# Patient Record
Sex: FEMALE | Race: WHITE | NOT HISPANIC OR LATINO | Employment: OTHER | URBAN - METROPOLITAN AREA
[De-identification: names, ages, dates, MRNs, and addresses within clinical notes are randomized per-mention and may not be internally consistent; named-entity substitution may affect disease eponyms.]

---

## 2024-02-19 ENCOUNTER — APPOINTMENT (OUTPATIENT)
Dept: CARDIOLOGY | Facility: HOSPITAL | Age: 89
DRG: 178 | End: 2024-02-19
Payer: COMMERCIAL

## 2024-02-19 ENCOUNTER — HOSPITAL ENCOUNTER (INPATIENT)
Facility: HOSPITAL | Age: 89
LOS: 3 days | Discharge: NURSING, REHAB OR LONG-TERM CARE | DRG: 178 | End: 2024-02-22
Attending: EMERGENCY MEDICINE | Admitting: INTERNAL MEDICINE
Payer: COMMERCIAL

## 2024-02-19 ENCOUNTER — APPOINTMENT (OUTPATIENT)
Dept: RADIOLOGY | Facility: HOSPITAL | Age: 89
DRG: 178 | End: 2024-02-19
Payer: COMMERCIAL

## 2024-02-19 DIAGNOSIS — K92.2 GASTROINTESTINAL HEMORRHAGE, UNSPECIFIED GASTROINTESTINAL HEMORRHAGE TYPE: Primary | ICD-10-CM

## 2024-02-19 LAB
ALBUMIN SERPL BCP-MCNC: 3.5 G/DL (ref 3.4–5)
ALP SERPL-CCNC: 77 U/L (ref 33–136)
ALT SERPL W P-5'-P-CCNC: 8 U/L (ref 7–45)
ANION GAP SERPL CALC-SCNC: 11 MMOL/L (ref 10–20)
AST SERPL W P-5'-P-CCNC: 21 U/L (ref 9–39)
BASOPHILS # BLD AUTO: 0.03 X10*3/UL (ref 0–0.1)
BASOPHILS NFR BLD AUTO: 0.6 %
BILIRUB SERPL-MCNC: 0.4 MG/DL (ref 0–1.2)
BUN SERPL-MCNC: 33 MG/DL (ref 6–23)
CALCIUM SERPL-MCNC: 8.7 MG/DL (ref 8.6–10.3)
CHLORIDE SERPL-SCNC: 108 MMOL/L (ref 98–107)
CO2 SERPL-SCNC: 25 MMOL/L (ref 21–32)
CREAT SERPL-MCNC: 0.97 MG/DL (ref 0.5–1.05)
EGFRCR SERPLBLD CKD-EPI 2021: 57 ML/MIN/1.73M*2
EOSINOPHIL # BLD AUTO: 0.03 X10*3/UL (ref 0–0.4)
EOSINOPHIL NFR BLD AUTO: 0.6 %
ERYTHROCYTE [DISTWIDTH] IN BLOOD BY AUTOMATED COUNT: 12.6 % (ref 11.5–14.5)
FLUAV RNA RESP QL NAA+PROBE: NOT DETECTED
FLUBV RNA RESP QL NAA+PROBE: NOT DETECTED
GLUCOSE BLD MANUAL STRIP-MCNC: 94 MG/DL (ref 74–99)
GLUCOSE SERPL-MCNC: 96 MG/DL (ref 74–99)
HCT VFR BLD AUTO: 37.9 % (ref 36–46)
HEMOCCULT SP1 STL QL: NEGATIVE
HGB BLD-MCNC: 11.8 G/DL (ref 12–16)
IMM GRANULOCYTES # BLD AUTO: 0.09 X10*3/UL (ref 0–0.5)
IMM GRANULOCYTES NFR BLD AUTO: 1.7 % (ref 0–0.9)
LYMPHOCYTES # BLD AUTO: 0.84 X10*3/UL (ref 0.8–3)
LYMPHOCYTES NFR BLD AUTO: 16.2 %
MCH RBC QN AUTO: 28.3 PG (ref 26–34)
MCHC RBC AUTO-ENTMCNC: 31.1 G/DL (ref 32–36)
MCV RBC AUTO: 91 FL (ref 80–100)
MONOCYTES # BLD AUTO: 0.52 X10*3/UL (ref 0.05–0.8)
MONOCYTES NFR BLD AUTO: 10 %
NEUTROPHILS # BLD AUTO: 3.68 X10*3/UL (ref 1.6–5.5)
NEUTROPHILS NFR BLD AUTO: 70.9 %
NRBC BLD-RTO: 0 /100 WBCS (ref 0–0)
PLATELET # BLD AUTO: 139 X10*3/UL (ref 150–450)
POTASSIUM SERPL-SCNC: 4.6 MMOL/L (ref 3.5–5.3)
PROT SERPL-MCNC: 6.9 G/DL (ref 6.4–8.2)
RBC # BLD AUTO: 4.17 X10*6/UL (ref 4–5.2)
SARS-COV-2 RNA RESP QL NAA+PROBE: NOT DETECTED
SODIUM SERPL-SCNC: 139 MMOL/L (ref 136–145)
WBC # BLD AUTO: 5.2 X10*3/UL (ref 4.4–11.3)

## 2024-02-19 PROCEDURE — 2500000004 HC RX 250 GENERAL PHARMACY W/ HCPCS (ALT 636 FOR OP/ED)

## 2024-02-19 PROCEDURE — 82947 ASSAY GLUCOSE BLOOD QUANT: CPT

## 2024-02-19 PROCEDURE — 74174 CTA ABD&PLVS W/CONTRAST: CPT

## 2024-02-19 PROCEDURE — 84075 ASSAY ALKALINE PHOSPHATASE: CPT

## 2024-02-19 PROCEDURE — 2550000001 HC RX 255 CONTRASTS: Performed by: EMERGENCY MEDICINE

## 2024-02-19 PROCEDURE — 87636 SARSCOV2 & INF A&B AMP PRB: CPT

## 2024-02-19 PROCEDURE — 93005 ELECTROCARDIOGRAM TRACING: CPT

## 2024-02-19 PROCEDURE — 36415 COLL VENOUS BLD VENIPUNCTURE: CPT

## 2024-02-19 PROCEDURE — 99285 EMERGENCY DEPT VISIT HI MDM: CPT | Mod: 25

## 2024-02-19 PROCEDURE — 1200000002 HC GENERAL ROOM WITH TELEMETRY DAILY

## 2024-02-19 PROCEDURE — 85025 COMPLETE CBC W/AUTO DIFF WBC: CPT

## 2024-02-19 PROCEDURE — 82270 OCCULT BLOOD FECES: CPT

## 2024-02-19 PROCEDURE — 71046 X-RAY EXAM CHEST 2 VIEWS: CPT

## 2024-02-19 PROCEDURE — 71046 X-RAY EXAM CHEST 2 VIEWS: CPT | Performed by: RADIOLOGY

## 2024-02-19 PROCEDURE — 99222 1ST HOSP IP/OBS MODERATE 55: CPT | Performed by: INTERNAL MEDICINE

## 2024-02-19 PROCEDURE — 74174 CTA ABD&PLVS W/CONTRAST: CPT | Performed by: STUDENT IN AN ORGANIZED HEALTH CARE EDUCATION/TRAINING PROGRAM

## 2024-02-19 PROCEDURE — C9113 INJ PANTOPRAZOLE SODIUM, VIA: HCPCS

## 2024-02-19 RX ORDER — ONDANSETRON 4 MG/1
4 TABLET, FILM COATED ORAL EVERY 8 HOURS PRN
Status: DISCONTINUED | OUTPATIENT
Start: 2024-02-19 | End: 2024-02-22 | Stop reason: HOSPADM

## 2024-02-19 RX ORDER — ACETAMINOPHEN 160 MG/5ML
650 SOLUTION ORAL EVERY 4 HOURS PRN
Status: DISCONTINUED | OUTPATIENT
Start: 2024-02-19 | End: 2024-02-22 | Stop reason: HOSPADM

## 2024-02-19 RX ORDER — PANTOPRAZOLE SODIUM 40 MG/10ML
40 INJECTION, POWDER, LYOPHILIZED, FOR SOLUTION INTRAVENOUS ONCE
Status: COMPLETED | OUTPATIENT
Start: 2024-02-19 | End: 2024-02-19

## 2024-02-19 RX ORDER — DEXTROSE MONOHYDRATE 100 MG/ML
0.3 INJECTION, SOLUTION INTRAVENOUS ONCE AS NEEDED
Status: DISCONTINUED | OUTPATIENT
Start: 2024-02-19 | End: 2024-02-22 | Stop reason: HOSPADM

## 2024-02-19 RX ORDER — ONDANSETRON HYDROCHLORIDE 2 MG/ML
4 INJECTION, SOLUTION INTRAVENOUS EVERY 8 HOURS PRN
Status: DISCONTINUED | OUTPATIENT
Start: 2024-02-19 | End: 2024-02-22 | Stop reason: HOSPADM

## 2024-02-19 RX ORDER — PANTOPRAZOLE SODIUM 40 MG/1
40 TABLET, DELAYED RELEASE ORAL EVERY 12 HOURS
Status: DISCONTINUED | OUTPATIENT
Start: 2024-02-19 | End: 2024-02-22 | Stop reason: HOSPADM

## 2024-02-19 RX ORDER — PANTOPRAZOLE SODIUM 40 MG/10ML
40 INJECTION, POWDER, LYOPHILIZED, FOR SOLUTION INTRAVENOUS EVERY 12 HOURS
Status: DISCONTINUED | OUTPATIENT
Start: 2024-02-19 | End: 2024-02-22 | Stop reason: HOSPADM

## 2024-02-19 RX ORDER — TALC
3 POWDER (GRAM) TOPICAL DAILY
Status: DISCONTINUED | OUTPATIENT
Start: 2024-02-19 | End: 2024-02-22 | Stop reason: HOSPADM

## 2024-02-19 RX ORDER — DEXTROSE 50 % IN WATER (D50W) INTRAVENOUS SYRINGE
25
Status: DISCONTINUED | OUTPATIENT
Start: 2024-02-19 | End: 2024-02-19

## 2024-02-19 RX ORDER — DEXTROSE 50 % IN WATER (D50W) INTRAVENOUS SYRINGE
25
Status: DISCONTINUED | OUTPATIENT
Start: 2024-02-19 | End: 2024-02-22 | Stop reason: HOSPADM

## 2024-02-19 RX ORDER — INSULIN LISPRO 100 [IU]/ML
0-10 INJECTION, SOLUTION INTRAVENOUS; SUBCUTANEOUS EVERY 4 HOURS
Status: DISCONTINUED | OUTPATIENT
Start: 2024-02-19 | End: 2024-02-21

## 2024-02-19 RX ORDER — ACETAMINOPHEN 650 MG/1
650 SUPPOSITORY RECTAL EVERY 4 HOURS PRN
Status: DISCONTINUED | OUTPATIENT
Start: 2024-02-19 | End: 2024-02-22 | Stop reason: HOSPADM

## 2024-02-19 RX ORDER — SODIUM CHLORIDE 9 MG/ML
100 INJECTION, SOLUTION INTRAVENOUS CONTINUOUS
Status: DISCONTINUED | OUTPATIENT
Start: 2024-02-19 | End: 2024-02-21

## 2024-02-19 RX ORDER — DEXTROSE MONOHYDRATE 100 MG/ML
0.3 INJECTION, SOLUTION INTRAVENOUS ONCE AS NEEDED
Status: DISCONTINUED | OUTPATIENT
Start: 2024-02-19 | End: 2024-02-19

## 2024-02-19 RX ORDER — ACETAMINOPHEN 325 MG/1
650 TABLET ORAL EVERY 4 HOURS PRN
Status: DISCONTINUED | OUTPATIENT
Start: 2024-02-19 | End: 2024-02-22 | Stop reason: HOSPADM

## 2024-02-19 RX ADMIN — IOHEXOL 90 ML: 350 INJECTION, SOLUTION INTRAVENOUS at 18:06

## 2024-02-19 RX ADMIN — PANTOPRAZOLE SODIUM 40 MG: 40 INJECTION, POWDER, FOR SOLUTION INTRAVENOUS at 13:45

## 2024-02-19 RX ADMIN — AMPICILLIN SODIUM AND SULBACTAM SODIUM 3 G: 2; 1 INJECTION, POWDER, FOR SOLUTION INTRAMUSCULAR; INTRAVENOUS at 19:28

## 2024-02-19 ASSESSMENT — ENCOUNTER SYMPTOMS
NAUSEA: 1
ABDOMINAL PAIN: 1
PSYCHIATRIC NEGATIVE: 1
MUSCULOSKELETAL NEGATIVE: 1
ENDOCRINE NEGATIVE: 1
NEUROLOGICAL NEGATIVE: 1
ALLERGIC/IMMUNOLOGIC NEGATIVE: 1
CARDIOVASCULAR NEGATIVE: 1
HEMATOLOGIC/LYMPHATIC NEGATIVE: 1
SHORTNESS OF BREATH: 1
ANAL BLEEDING: 1
APPETITE CHANGE: 1
COUGH: 1
EYES NEGATIVE: 1

## 2024-02-19 ASSESSMENT — PAIN - FUNCTIONAL ASSESSMENT: PAIN_FUNCTIONAL_ASSESSMENT: 0-10

## 2024-02-19 ASSESSMENT — COLUMBIA-SUICIDE SEVERITY RATING SCALE - C-SSRS
6. HAVE YOU EVER DONE ANYTHING, STARTED TO DO ANYTHING, OR PREPARED TO DO ANYTHING TO END YOUR LIFE?: NO
1. IN THE PAST MONTH, HAVE YOU WISHED YOU WERE DEAD OR WISHED YOU COULD GO TO SLEEP AND NOT WAKE UP?: NO
2. HAVE YOU ACTUALLY HAD ANY THOUGHTS OF KILLING YOURSELF?: NO

## 2024-02-19 NOTE — ED PROVIDER NOTES
CC: Rectal Bleeding ([facility] staff called and stated that they noticed bright red and dark red blood in the pt's stool that started last night. [facility] staff stated the pt fell around 1700 yesterday and could not give details besides no loc, no blood thinners. Pt has a hx of dementia )     HPI: Patient is an 89 yo female with hx of HTN, Dementia (Aox1 baseline), HF, Afib (on apixiban), Gout, iron deficiency anemia, hypothyroidism, DM, presenting to the emergency department for GI bleed. Per her nursing home, had a melanotic stool last night and had an episode of dark/bright red tarry stools in her bed that continued into this morning. Has never had lower GI bleed before. Nursing home also reports productive cough worse this morning. They believe she is at her neurologic baseline today. Hemoglobin at her facility has been chronically in the 10-12 range. On arrival to  emergency department she is AO x 1 for us. No acute complaints yesterday, denies abdominal pain.  No fevers/chills reported at home.       Records Reviewed:  Recent available ED and inpatient notes reviewed in EMR.    PMHx/PSHx:  Per HPI.   - has no past medical history on file.  - has no past surgical history on file.  - does not have a problem list on file.    Medications:  Reviewed in EMR. See EMR for complete list of medications and doses.    Allergies:  Patient has no known allergies.    Social History:  - Tobacco:  has no history on file for tobacco use.   - Alcohol:  has no history on file for alcohol use.   - Illicit Drugs:  has no history on file for drug use.     ROS:  Per HPI.       ???????????????????????????????????????????????????????????????  Triage Vitals:  T 36.9 °C (98.4 °F)  HR 65  /78  RR 16  O2 97 % None (Room air)    Physical Exam  Vitals and nursing note reviewed.   Constitutional:       General: She is not in acute distress.     Appearance: She is well-developed.   HENT:      Head: Normocephalic and atraumatic.   Eyes:       Conjunctiva/sclera: Conjunctivae normal.   Cardiovascular:      Rate and Rhythm: Normal rate and regular rhythm.      Heart sounds: No murmur heard.  Pulmonary:      Effort: Pulmonary effort is normal. No respiratory distress.      Breath sounds: Normal breath sounds.   Abdominal:      Palpations: Abdomen is soft.      Tenderness: There is no abdominal tenderness.   Musculoskeletal:         General: No swelling.      Cervical back: Neck supple.   Skin:     General: Skin is warm and dry.      Capillary Refill: Capillary refill takes less than 2 seconds.   Neurological:      Mental Status: She is alert.      Sensory: Sensation is intact.      Motor: Motor function is intact.      Comments: Oriented x1   Psychiatric:         Mood and Affect: Mood normal.       ???????????????????????????????????????????????????????????????  EKG: Regular rate, irregular rhythm.  Atrial fibrillation seen.  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  QRS, QTc intervals within normal limits.  No ST elevations, depressions, T wave inversions.      Medical Decision Making   Patient is an 88-year-old female presenting to the emergency department today for GI bleed. On arrival, vital signs WNL. On examination, patient is AOx1.  Clear heart and lung sounds bilaterally.  No other acute complaints for us today.  Per nursing home report, has had multiple melanotic stools for the past 24 hours now so we will get a CBC, CMP, and CT abdomen pelvis with contrast for further evaluation.    Update: Chemistry showed elevated BUN consistent with patient's lower GI bleed.  Hemoglobin 11.8.  Do not have previous levels for her but nursing home reports she lives around 12.  Vital signs otherwise normal, lower concern for hemorrhagic shock at this time chest x-ray showed no acute cardiopulmonary process.  COVID/flu negative. Digital rectal exam showed light brown stool, no melena or evidence of bleeding. CTA A/p showed No evidence of active gastrointestinal  hemorrhage with small amount of aspiration pneumonia. Started on Unasyn and will be admitted today for further evaluation.      Diagnoses as of 02/19/24 1851   Gastrointestinal hemorrhage, unspecified gastrointestinal hemorrhage type       Social Determinants Limiting Care:  None identified    Disposition:   Admit    Jorge Patel MD  Emergency Medicine PGY2      Procedures ? [employer] last updated 2/19/2024 6:51 PM          Jorge Patel MD  Resident  02/19/24 1851

## 2024-02-20 LAB
ANION GAP SERPL CALC-SCNC: 14 MMOL/L (ref 10–20)
BUN SERPL-MCNC: 25 MG/DL (ref 6–23)
CALCIUM SERPL-MCNC: 8.7 MG/DL (ref 8.6–10.3)
CHLORIDE SERPL-SCNC: 108 MMOL/L (ref 98–107)
CO2 SERPL-SCNC: 22 MMOL/L (ref 21–32)
CREAT SERPL-MCNC: 1.02 MG/DL (ref 0.5–1.05)
EGFRCR SERPLBLD CKD-EPI 2021: 54 ML/MIN/1.73M*2
ERYTHROCYTE [DISTWIDTH] IN BLOOD BY AUTOMATED COUNT: 12.8 % (ref 11.5–14.5)
EST. AVERAGE GLUCOSE BLD GHB EST-MCNC: 100 MG/DL
GLUCOSE BLD MANUAL STRIP-MCNC: 75 MG/DL (ref 74–99)
GLUCOSE BLD MANUAL STRIP-MCNC: 79 MG/DL (ref 74–99)
GLUCOSE BLD MANUAL STRIP-MCNC: 84 MG/DL (ref 74–99)
GLUCOSE BLD MANUAL STRIP-MCNC: 90 MG/DL (ref 74–99)
GLUCOSE SERPL-MCNC: 75 MG/DL (ref 74–99)
HBA1C MFR BLD: 5.1 %
HCT VFR BLD AUTO: 37.9 % (ref 36–46)
HGB BLD-MCNC: 11.1 G/DL (ref 12–16)
MCH RBC QN AUTO: 27.8 PG (ref 26–34)
MCHC RBC AUTO-ENTMCNC: 29.3 G/DL (ref 32–36)
MCV RBC AUTO: 95 FL (ref 80–100)
NRBC BLD-RTO: 0 /100 WBCS (ref 0–0)
PLATELET # BLD AUTO: 125 X10*3/UL (ref 150–450)
POTASSIUM SERPL-SCNC: 4.5 MMOL/L (ref 3.5–5.3)
RBC # BLD AUTO: 4 X10*6/UL (ref 4–5.2)
SODIUM SERPL-SCNC: 139 MMOL/L (ref 136–145)
WBC # BLD AUTO: 6.5 X10*3/UL (ref 4.4–11.3)

## 2024-02-20 PROCEDURE — 1200000002 HC GENERAL ROOM WITH TELEMETRY DAILY

## 2024-02-20 PROCEDURE — 2500000004 HC RX 250 GENERAL PHARMACY W/ HCPCS (ALT 636 FOR OP/ED): Performed by: INTERNAL MEDICINE

## 2024-02-20 PROCEDURE — 85027 COMPLETE CBC AUTOMATED: CPT | Performed by: INTERNAL MEDICINE

## 2024-02-20 PROCEDURE — 80048 BASIC METABOLIC PNL TOTAL CA: CPT | Performed by: INTERNAL MEDICINE

## 2024-02-20 PROCEDURE — 94667 MNPJ CHEST WALL 1ST: CPT

## 2024-02-20 PROCEDURE — 87389 HIV-1 AG W/HIV-1&-2 AB AG IA: CPT | Mod: AHULAB | Performed by: HOSPITALIST

## 2024-02-20 PROCEDURE — 99222 1ST HOSP IP/OBS MODERATE 55: CPT

## 2024-02-20 PROCEDURE — 2500000001 HC RX 250 WO HCPCS SELF ADMINISTERED DRUGS (ALT 637 FOR MEDICARE OP): Performed by: INTERNAL MEDICINE

## 2024-02-20 PROCEDURE — 2500000002 HC RX 250 W HCPCS SELF ADMINISTERED DRUGS (ALT 637 FOR MEDICARE OP, ALT 636 FOR OP/ED): Mod: MUE | Performed by: HOSPITALIST

## 2024-02-20 PROCEDURE — 82607 VITAMIN B-12: CPT | Mod: AHULAB | Performed by: HOSPITALIST

## 2024-02-20 PROCEDURE — 99222 1ST HOSP IP/OBS MODERATE 55: CPT | Performed by: CLINICAL NURSE SPECIALIST

## 2024-02-20 PROCEDURE — 2500000001 HC RX 250 WO HCPCS SELF ADMINISTERED DRUGS (ALT 637 FOR MEDICARE OP): Performed by: HOSPITALIST

## 2024-02-20 PROCEDURE — C9113 INJ PANTOPRAZOLE SODIUM, VIA: HCPCS | Performed by: INTERNAL MEDICINE

## 2024-02-20 PROCEDURE — 82746 ASSAY OF FOLIC ACID SERUM: CPT | Mod: AHULAB | Performed by: HOSPITALIST

## 2024-02-20 PROCEDURE — 82947 ASSAY GLUCOSE BLOOD QUANT: CPT

## 2024-02-20 PROCEDURE — 94640 AIRWAY INHALATION TREATMENT: CPT

## 2024-02-20 PROCEDURE — 83036 HEMOGLOBIN GLYCOSYLATED A1C: CPT | Mod: AHULAB | Performed by: INTERNAL MEDICINE

## 2024-02-20 PROCEDURE — 92610 EVALUATE SWALLOWING FUNCTION: CPT | Mod: GN | Performed by: SPEECH-LANGUAGE PATHOLOGIST

## 2024-02-20 PROCEDURE — 99232 SBSQ HOSP IP/OBS MODERATE 35: CPT | Performed by: HOSPITALIST

## 2024-02-20 PROCEDURE — 36415 COLL VENOUS BLD VENIPUNCTURE: CPT | Performed by: INTERNAL MEDICINE

## 2024-02-20 RX ORDER — UBIDECARENONE 75 MG
500 CAPSULE ORAL DAILY
Status: DISCONTINUED | OUTPATIENT
Start: 2024-02-20 | End: 2024-02-22 | Stop reason: HOSPADM

## 2024-02-20 RX ORDER — UBIDECARENONE 75 MG
500 CAPSULE ORAL DAILY
COMMUNITY

## 2024-02-20 RX ORDER — IPRATROPIUM BROMIDE AND ALBUTEROL SULFATE 2.5; .5 MG/3ML; MG/3ML
3 SOLUTION RESPIRATORY (INHALATION) 3 TIMES DAILY
Status: DISCONTINUED | OUTPATIENT
Start: 2024-02-20 | End: 2024-02-22 | Stop reason: HOSPADM

## 2024-02-20 RX ORDER — LISINOPRIL 10 MG/1
10 TABLET ORAL DAILY
COMMUNITY

## 2024-02-20 RX ORDER — CHOLECALCIFEROL (VITAMIN D3) 25 MCG
1000 TABLET ORAL DAILY
Status: DISCONTINUED | OUTPATIENT
Start: 2024-02-20 | End: 2024-02-22 | Stop reason: HOSPADM

## 2024-02-20 RX ORDER — SERTRALINE HYDROCHLORIDE 50 MG/1
25 TABLET, FILM COATED ORAL DAILY
Status: DISCONTINUED | OUTPATIENT
Start: 2024-02-20 | End: 2024-02-22 | Stop reason: HOSPADM

## 2024-02-20 RX ORDER — CHOLECALCIFEROL (VITAMIN D3) 25 MCG
1000 TABLET ORAL DAILY
COMMUNITY

## 2024-02-20 RX ORDER — METOPROLOL TARTRATE 25 MG/1
25 TABLET, FILM COATED ORAL DAILY
Status: DISCONTINUED | OUTPATIENT
Start: 2024-02-20 | End: 2024-02-22 | Stop reason: HOSPADM

## 2024-02-20 RX ORDER — QUETIAPINE FUMARATE 25 MG/1
25 TABLET, FILM COATED ORAL 2 TIMES DAILY
Status: DISCONTINUED | OUTPATIENT
Start: 2024-02-20 | End: 2024-02-22 | Stop reason: HOSPADM

## 2024-02-20 RX ORDER — HYDROGEN PEROXIDE 3 %
20 SOLUTION, NON-ORAL MISCELLANEOUS
COMMUNITY
End: 2024-02-22 | Stop reason: HOSPADM

## 2024-02-20 RX ORDER — SERTRALINE HYDROCHLORIDE 25 MG/1
25 TABLET, FILM COATED ORAL DAILY
COMMUNITY
End: 2024-03-19 | Stop reason: ENTERED-IN-ERROR

## 2024-02-20 RX ORDER — DIVALPROEX SODIUM 125 MG/1
125 CAPSULE, COATED PELLETS ORAL 2 TIMES DAILY
COMMUNITY

## 2024-02-20 RX ORDER — METOPROLOL TARTRATE 25 MG/1
25 TABLET, FILM COATED ORAL DAILY
COMMUNITY

## 2024-02-20 RX ORDER — DIVALPROEX SODIUM 125 MG/1
125 CAPSULE, COATED PELLETS ORAL 2 TIMES DAILY
Status: DISCONTINUED | OUTPATIENT
Start: 2024-02-20 | End: 2024-02-22 | Stop reason: HOSPADM

## 2024-02-20 RX ORDER — IPRATROPIUM BROMIDE AND ALBUTEROL SULFATE 2.5; .5 MG/3ML; MG/3ML
3 SOLUTION RESPIRATORY (INHALATION) EVERY 4 HOURS PRN
Status: DISCONTINUED | OUTPATIENT
Start: 2024-02-20 | End: 2024-02-22 | Stop reason: HOSPADM

## 2024-02-20 RX ORDER — ACETAMINOPHEN 500 MG
1000 TABLET ORAL 2 TIMES DAILY PRN
COMMUNITY

## 2024-02-20 RX ORDER — QUETIAPINE FUMARATE 25 MG/1
25 TABLET, FILM COATED ORAL 2 TIMES DAILY
COMMUNITY

## 2024-02-20 RX ADMIN — IPRATROPIUM BROMIDE AND ALBUTEROL SULFATE 3 ML: 2.5; .5 SOLUTION RESPIRATORY (INHALATION) at 20:22

## 2024-02-20 RX ADMIN — DIVALPROEX SODIUM 125 MG: 125 CAPSULE, COATED PELLETS ORAL at 16:20

## 2024-02-20 RX ADMIN — AMPICILLIN SODIUM AND SULBACTAM SODIUM 3 G: 2; 1 INJECTION, POWDER, FOR SOLUTION INTRAMUSCULAR; INTRAVENOUS at 00:59

## 2024-02-20 RX ADMIN — AMPICILLIN SODIUM AND SULBACTAM SODIUM 3 G: 2; 1 INJECTION, POWDER, FOR SOLUTION INTRAMUSCULAR; INTRAVENOUS at 22:57

## 2024-02-20 RX ADMIN — Medication 3 MG: at 01:00

## 2024-02-20 RX ADMIN — SODIUM CHLORIDE 100 ML/HR: 900 INJECTION INTRAVENOUS at 01:00

## 2024-02-20 RX ADMIN — AMPICILLIN SODIUM AND SULBACTAM SODIUM 3 G: 2; 1 INJECTION, POWDER, FOR SOLUTION INTRAMUSCULAR; INTRAVENOUS at 06:21

## 2024-02-20 RX ADMIN — METOPROLOL TARTRATE 25 MG: 25 TABLET, FILM COATED ORAL at 16:21

## 2024-02-20 RX ADMIN — APIXABAN 5 MG: 5 TABLET, FILM COATED ORAL at 20:43

## 2024-02-20 RX ADMIN — PANTOPRAZOLE SODIUM 40 MG: 40 INJECTION, POWDER, FOR SOLUTION INTRAVENOUS at 01:03

## 2024-02-20 RX ADMIN — DIVALPROEX SODIUM 125 MG: 125 CAPSULE, COATED PELLETS ORAL at 20:42

## 2024-02-20 RX ADMIN — AMPICILLIN SODIUM AND SULBACTAM SODIUM 3 G: 2; 1 INJECTION, POWDER, FOR SOLUTION INTRAMUSCULAR; INTRAVENOUS at 16:25

## 2024-02-20 RX ADMIN — Medication 3 MG: at 20:43

## 2024-02-20 RX ADMIN — SERTRALINE HYDROCHLORIDE 25 MG: 50 TABLET ORAL at 16:21

## 2024-02-20 RX ADMIN — PANTOPRAZOLE SODIUM 40 MG: 40 INJECTION, POWDER, FOR SOLUTION INTRAVENOUS at 22:57

## 2024-02-20 RX ADMIN — QUETIAPINE FUMARATE 25 MG: 25 TABLET ORAL at 20:43

## 2024-02-20 SDOH — SOCIAL STABILITY: SOCIAL INSECURITY: ARE THERE ANY APPARENT SIGNS OF INJURIES/BEHAVIORS THAT COULD BE RELATED TO ABUSE/NEGLECT?: UNABLE TO ASSESS

## 2024-02-20 SDOH — SOCIAL STABILITY: SOCIAL INSECURITY: WERE YOU ABLE TO COMPLETE ALL THE BEHAVIORAL HEALTH SCREENINGS?: YES

## 2024-02-20 SDOH — SOCIAL STABILITY: SOCIAL INSECURITY: DOES ANYONE TRY TO KEEP YOU FROM HAVING/CONTACTING OTHER FRIENDS OR DOING THINGS OUTSIDE YOUR HOME?: UNABLE TO ASSESS

## 2024-02-20 SDOH — SOCIAL STABILITY: SOCIAL INSECURITY: DO YOU FEEL ANYONE HAS EXPLOITED OR TAKEN ADVANTAGE OF YOU FINANCIALLY OR OF YOUR PERSONAL PROPERTY?: UNABLE TO ASSESS

## 2024-02-20 SDOH — SOCIAL STABILITY: SOCIAL INSECURITY: DO YOU FEEL UNSAFE GOING BACK TO THE PLACE WHERE YOU ARE LIVING?: UNABLE TO ASSESS

## 2024-02-20 SDOH — SOCIAL STABILITY: SOCIAL INSECURITY: ABUSE: ADULT

## 2024-02-20 SDOH — SOCIAL STABILITY: SOCIAL INSECURITY: HAVE YOU HAD THOUGHTS OF HARMING ANYONE ELSE?: NO

## 2024-02-20 SDOH — SOCIAL STABILITY: SOCIAL INSECURITY: ARE YOU OR HAVE YOU BEEN THREATENED OR ABUSED PHYSICALLY, EMOTIONALLY, OR SEXUALLY BY ANYONE?: UNABLE TO ASSESS

## 2024-02-20 SDOH — SOCIAL STABILITY: SOCIAL INSECURITY: HAS ANYONE EVER THREATENED TO HURT YOUR FAMILY OR YOUR PETS?: UNABLE TO ASSESS

## 2024-02-20 ASSESSMENT — LIFESTYLE VARIABLES
SKIP TO QUESTIONS 9-10: 1
HOW OFTEN DO YOU HAVE 6 OR MORE DRINKS ON ONE OCCASION: NEVER
HOW OFTEN DO YOU HAVE A DRINK CONTAINING ALCOHOL: NEVER
AUDIT-C TOTAL SCORE: 0
AUDIT-C TOTAL SCORE: 0
HOW MANY STANDARD DRINKS CONTAINING ALCOHOL DO YOU HAVE ON A TYPICAL DAY: PATIENT DOES NOT DRINK

## 2024-02-20 ASSESSMENT — COGNITIVE AND FUNCTIONAL STATUS - GENERAL
TOILETING: A LOT
STANDING UP FROM CHAIR USING ARMS: A LOT
TOILETING: A LOT
PERSONAL GROOMING: A LITTLE
MOVING FROM LYING ON BACK TO SITTING ON SIDE OF FLAT BED WITH BEDRAILS: A LITTLE
MOBILITY SCORE: 15
WALKING IN HOSPITAL ROOM: A LOT
MOVING TO AND FROM BED TO CHAIR: A LITTLE
DRESSING REGULAR LOWER BODY CLOTHING: A LITTLE
EATING MEALS: A LITTLE
PERSONAL GROOMING: A LITTLE
PATIENT BASELINE BEDBOUND: NO
TURNING FROM BACK TO SIDE WHILE IN FLAT BAD: A LITTLE
DAILY ACTIVITIY SCORE: 16
DAILY ACTIVITIY SCORE: 16
MOVING TO AND FROM BED TO CHAIR: A LOT
CLIMB 3 TO 5 STEPS WITH RAILING: TOTAL
STANDING UP FROM CHAIR USING ARMS: A LOT
DRESSING REGULAR LOWER BODY CLOTHING: A LITTLE
DRESSING REGULAR UPPER BODY CLOTHING: A LOT
WALKING IN HOSPITAL ROOM: A LOT
HELP NEEDED FOR BATHING: A LOT
TURNING FROM BACK TO SIDE WHILE IN FLAT BAD: A LITTLE
MOBILITY SCORE: 14
DRESSING REGULAR UPPER BODY CLOTHING: A LITTLE
CLIMB 3 TO 5 STEPS WITH RAILING: A LOT
EATING MEALS: A LITTLE
HELP NEEDED FOR BATHING: A LITTLE

## 2024-02-20 ASSESSMENT — ACTIVITIES OF DAILY LIVING (ADL)
WALKS IN HOME: NEEDS ASSISTANCE
FEEDING YOURSELF: NEEDS ASSISTANCE
LACK_OF_TRANSPORTATION: NO
TOILETING: NEEDS ASSISTANCE
HEARING - LEFT EAR: DIFFICULTY WITH NOISE
BATHING: NEEDS ASSISTANCE
ADEQUATE_TO_COMPLETE_ADL: YES
JUDGMENT_ADEQUATE_SAFELY_COMPLETE_DAILY_ACTIVITIES: NO
DRESSING YOURSELF: NEEDS ASSISTANCE
PATIENT'S MEMORY ADEQUATE TO SAFELY COMPLETE DAILY ACTIVITIES?: NO
ASSISTIVE_DEVICE: WALKER
HEARING - RIGHT EAR: DIFFICULTY WITH NOISE
GROOMING: NEEDS ASSISTANCE

## 2024-02-20 ASSESSMENT — PATIENT HEALTH QUESTIONNAIRE - PHQ9
2. FEELING DOWN, DEPRESSED OR HOPELESS: NOT AT ALL
1. LITTLE INTEREST OR PLEASURE IN DOING THINGS: NOT AT ALL
SUM OF ALL RESPONSES TO PHQ9 QUESTIONS 1 & 2: 0

## 2024-02-20 ASSESSMENT — PAIN - FUNCTIONAL ASSESSMENT
PAIN_FUNCTIONAL_ASSESSMENT: 0-10
PAIN_FUNCTIONAL_ASSESSMENT: 0-10

## 2024-02-20 ASSESSMENT — PAIN SCALES - GENERAL
PAINLEVEL_OUTOF10: 0 - NO PAIN
PAINLEVEL_OUTOF10: 0 - NO PAIN

## 2024-02-20 NOTE — H&P
History Of Present Illness  Arielle Alcaraz is a 88 y.o. female with a past medical history of hypertension, diabetes mellitus type 2, gout, iron deficiency anemia, hypothyroidism, chronic atrial fibrillation on apixaban presenting with GI bleeding and aspiration pneumonia from a nursing home.  Per skilled nursing home staff began having melanotic stool red tarry stools last evening.  She is on apixaban for chronic atrial fibrillation.  At baseline the patient is awake and alert and oriented x 1.  Denies any fever or chills or abdominal pain.  Hemoglobin in the ER was 11.8.  CT of the abdomen and pelvis showed aspiration pneumonia but no active GI bleeding     Past Medical History  Chronic atrial fibrillation on apixaban  Type 2 diabetes mellitus  Hypertension  Dementia oriented x 1 at baseline  Hypothyroidism    Surgical History  No past surgical history on file.      Social History  She has no history on file for tobacco use, alcohol use, and drug use.    Family History  No family history on file.     Allergies  Patient has no known allergies.    Review of Systems   Constitutional:  Positive for appetite change.        Oriented x 1   HENT: Negative.     Eyes: Negative.    Respiratory:  Positive for cough and shortness of breath.    Cardiovascular: Negative.    Gastrointestinal:  Positive for abdominal pain, anal bleeding and nausea.   Endocrine: Negative.    Genitourinary: Negative.    Musculoskeletal: Negative.    Skin: Negative.    Allergic/Immunologic: Negative.    Neurological: Negative.    Hematological: Negative.    Psychiatric/Behavioral: Negative.     All other systems reviewed and are negative.       Physical Exam  Vitals and nursing note reviewed.   Constitutional:       Appearance: Normal appearance. She is ill-appearing.      Comments: Covered in melena   HENT:      Head: Normocephalic.      Right Ear: External ear normal.      Left Ear: External ear normal.      Nose: Nose normal.      Mouth/Throat:       Mouth: Mucous membranes are dry.      Pharynx: Oropharynx is clear.   Eyes:      Extraocular Movements: Extraocular movements intact.      Conjunctiva/sclera: Conjunctivae normal.      Pupils: Pupils are equal, round, and reactive to light.   Cardiovascular:      Rate and Rhythm: Normal rate and regular rhythm.   Pulmonary:      Effort: Pulmonary effort is normal.      Breath sounds: Rales present.      Comments: Bibasilar rales  Abdominal:      General: Abdomen is flat. Bowel sounds are normal.      Palpations: Abdomen is soft.      Tenderness: There is abdominal tenderness.   Musculoskeletal:         General: Normal range of motion.   Skin:     General: Skin is warm and dry.   Neurological:      General: No focal deficit present.      Mental Status: She is alert. Mental status is at baseline.      Comments: Oriented x 1   Psychiatric:         Mood and Affect: Mood normal.         Behavior: Behavior normal.          Last Recorded Vitals  Blood pressure 120/50, pulse 77, temperature 36.9 °C (98.4 °F), temperature source Oral, resp. rate 20, SpO2 95 %.    Relevant Results  Meds:  Scheduled medications  ampicillin-sulbactam, 3 g, intravenous, Once  ampicillin-sulbactam, 3 g, intravenous, q6h  insulin lispro, 0-10 Units, subcutaneous, q4h      Continuous medications     PRN medications  PRN medications: dextrose 10 % in water (D10W), dextrose, glucagon   No current outpatient medications     Labs:  Results for orders placed or performed during the hospital encounter of 02/19/24 (from the past 24 hour(s))   CBC and Auto Differential   Result Value Ref Range    WBC 5.2 4.4 - 11.3 x10*3/uL    nRBC 0.0 0.0 - 0.0 /100 WBCs    RBC 4.17 4.00 - 5.20 x10*6/uL    Hemoglobin 11.8 (L) 12.0 - 16.0 g/dL    Hematocrit 37.9 36.0 - 46.0 %    MCV 91 80 - 100 fL    MCH 28.3 26.0 - 34.0 pg    MCHC 31.1 (L) 32.0 - 36.0 g/dL    RDW 12.6 11.5 - 14.5 %    Platelets 139 (L) 150 - 450 x10*3/uL    Neutrophils % 70.9 40.0 - 80.0 %    Immature  Granulocytes %, Automated 1.7 (H) 0.0 - 0.9 %    Lymphocytes % 16.2 13.0 - 44.0 %    Monocytes % 10.0 2.0 - 10.0 %    Eosinophils % 0.6 0.0 - 6.0 %    Basophils % 0.6 0.0 - 2.0 %    Neutrophils Absolute 3.68 1.60 - 5.50 x10*3/uL    Immature Granulocytes Absolute, Automated 0.09 0.00 - 0.50 x10*3/uL    Lymphocytes Absolute 0.84 0.80 - 3.00 x10*3/uL    Monocytes Absolute 0.52 0.05 - 0.80 x10*3/uL    Eosinophils Absolute 0.03 0.00 - 0.40 x10*3/uL    Basophils Absolute 0.03 0.00 - 0.10 x10*3/uL   Comprehensive metabolic panel   Result Value Ref Range    Glucose 96 74 - 99 mg/dL    Sodium 139 136 - 145 mmol/L    Potassium 4.6 3.5 - 5.3 mmol/L    Chloride 108 (H) 98 - 107 mmol/L    Bicarbonate 25 21 - 32 mmol/L    Anion Gap 11 10 - 20 mmol/L    Urea Nitrogen 33 (H) 6 - 23 mg/dL    Creatinine 0.97 0.50 - 1.05 mg/dL    eGFR 57 (L) >60 mL/min/1.73m*2    Calcium 8.7 8.6 - 10.3 mg/dL    Albumin 3.5 3.4 - 5.0 g/dL    Alkaline Phosphatase 77 33 - 136 U/L    Total Protein 6.9 6.4 - 8.2 g/dL    AST 21 9 - 39 U/L    Bilirubin, Total 0.4 0.0 - 1.2 mg/dL    ALT 8 7 - 45 U/L   ECG 12 lead   Result Value Ref Range    Ventricular Rate 61 BPM    QRS Duration 106 ms    QT Interval 430 ms    QTC Calculation(Bazett) 432 ms    R Axis -54 degrees    T Axis 1 degrees    QRS Count 10 beats    Q Onset 208 ms    T Offset 423 ms    QTC Fredericia 432 ms   Sars-CoV-2 PCR   Result Value Ref Range    Coronavirus 2019, PCR Not Detected Not Detected   Influenza A, and B PCR   Result Value Ref Range    Flu A Result Not Detected Not Detected    Flu B Result Not Detected Not Detected      Imaging:  CT angio abdomen pelvis w and or wo IV IV contrast    Result Date: 2/19/2024  Interpreted By:  Donovan Gonzalez, STUDY: CT ANGIO ABDOMEN PELVIS W AND/OR WO IV IV CONTRAST;  2/19/2024 6:10 pm   INDICATION: Signs/Symptoms:Lower GI bleed evaluation.   COMPARISON: None.   ACCESSION NUMBER(S): [id number]   ORDERING CLINICIAN: SUZY EID   TECHNIQUE:  Contiguous non-contrast axial images of the abdomen and pelvis were initially obtained.   Thin-section axial images of the abdomen and pelvis were obtained in the arterial and portal venous phase after intravenous administration of  mL Omnipaque 350 contrast. Sagittal and coronal reformatted images were provided. MIP images and 3D reconstructions were created on an independent workstation and reviewed.   FINDINGS: VASCULATURE:   ABDOMINAL AORTA: No abdominal aortic aneurysm or dissection. Mild abdominal aortic atherosclerosis.   ABDOMINAL and PELVIC ARTERIES:  No hemodynamically significant stenosis or occlusion.     CT ABDOMEN/PELVIS:   LOWER CHEST: There are multifocal peribronchovascular ground-glass opacities in the (right > left) lower lobes consistent with pneumonia, probably due to aspiration given the debris in the lower lobe bronchi with diffuse wall thickening. There is also mild diffuse interlobular septal thickening suggestive of interstitial edema. No pleural effusions. The heart is enlarged due to four-chamber dilatation, disproportionately affecting the atria.   ABDOMINAL WALL: Irregular scarring of the infraumbilical abdominal wall. Postsurgical changes of ventral hernia repair.   LIVER: No significant parenchymal abnormality.   BILE DUCTS: Minimal dilatation of the anterior segmental ducts in the right lobe. No dilatation of the extrahepatic bile ducts or elsewhere.   GALLBLADDER: Surgically absent.   SPLEEN: No significant abnormality. Single calcified granuloma noted.   PANCREAS: No significant abnormality.   ADRENALS: A 0.6 cm hypodense nodule in the apex of the left adrenal gland is consistent with an adenoma. Normal right adrenal gland.   KIDNEYS, URETERS, BLADDER: There are multiple bilateral simple renal cysts and too small to characterize hypodense foci statistically representing benign cysts as well. No hydronephrosis.   REPRODUCTIVE ORGANS: No significant abnormality.   VESSELS: (See  above). No additional significant abnormality.   LYMPH NODES/RETROPERITONEUM: No enlarged lymph nodes. No acute retroperitoneal abnormality.   BOWEL/MESENTERY/PERITONEUM: No gastrointestinal hemorrhage identified. No bowel wall thickening or dilatation.  Normal appendix.   No significant ascites, free air, or fluid collection.     OSSEOUS STRUCTURES: No acute osseous abnormality. Osteopenic bone. Healed fracture of the sacrococcygeal junction noted. There is grade 1 anterolisthesis of L5 on S1 and grade 1 retrolisthesis of L2 on L3, L3 on L4. There is severe lower lumbar facet arthropathy resulting in moderate to high-grade foraminal stenosis from L2-L3 through L5-S1. There is also severe bilateral lateral recess stenosis at L5-S1. Severe right hip osteoarthrosis.       1. No evidence of active gastrointestinal hemorrhage.   2. Small amount of aspiration pneumonia, as well as mild pulmonary edema in the lower lobes.       MACRO: None.   Signed by: Donovan Gonzalez 2/19/2024 6:45 PM Dictation workstation:   [id number]    ECG 12 lead    Result Date: 2/19/2024  Atrial fibrillation Left anterior fascicular block Left ventricular hypertrophy ( R in aVL , Geraldine product , Romhilt-Whitney ) Abnormal ECG No previous ECGs available    XR chest 2 views    Result Date: 2/19/2024  Interpreted By:  Serafin Pulido, STUDY: XR CHEST 2 VIEWS;  2/19/2024 3:16 pm   INDICATION: Signs/Symptoms:productive cough.   COMPARISON: None.   ACCESSION NUMBER(S): [id number]   ORDERING CLINICIAN: SUZY EID   FINDINGS:   The cardiac silhouette is unremarkable. Costophrenic angles are sharp. Calcified granuloma in the right upper lung. Lungs are otherwise clear.. The trachea is midline. There is no pneumothorax. No acute osseous abnormality is seen.       1.  No active cardiopulmonary process.     Signed by: Serafin Pulido 2/19/2024 3:18 PM Dictation workstation:   [id number]       Assessment/Plan   GI bleeding  Plan:  Hold apixaban  IV  hydration  Protonix 40 mg IV every 12 hours  GI consultation    Aspiration pneumonia  Plan:  Unasyn 3 g IV every 6 hours  Pulmonary consultation    Chronic atrial fibrillation  Plan:  Holding apixaban.  Telemetry  Resume home meds when list becomes available    CHF, unknown type, hypertension, hypothyroidism,   Plan:  When list becomes available    DVT prophylaxis  No apixaban heparin or Lovenox due to active GI bleeding  SCDs    I spent 60 minutes in the professional and overall care of this patient.      Everardo Díaz, DO

## 2024-02-20 NOTE — PROGRESS NOTES
Meseret mejía [facility] on memory unit      02/20/24 0852   Current Planned Discharge Disposition   Current Planned Discharge Disposition SNF

## 2024-02-20 NOTE — CONSULTS
"Reason For Consult  Aspiration pneumonia    History Of Present Illness  Arielle Alcaraz is a 88 y.o. female with past medical history of dementia (baseline oriented x 1), HTN, HF, A-fib (on apixaban), gout, hypothyroidism, iron deficiency anemia and diabetes presented to the ED from her skilled facility reporting the patient had fallen the day before, denying LOC, and noting bright red and dark red blood in the patient's stool.  Nursing home staff additionally reported a productive cough with the patient reported no acute complaints.  EKG showed A-fib without acute ischemia.  Labs with hemoglobin 11.8, no leukocytosis; negative for flu and COVID.  CXR negative.  CT A/P showing multifocal peribronchovascular GGO's in b/l lower lobes, concerning for pneumonia.  Treatment was started with Unasyn and patient was admitted for further evaluation.  Pulmonology is consulted for aspiration pneumonia.    Patient seen and examined.  Course as above.  Limited medical records available for review with patient being very poor historian.    When asked why she came to the hospital the patient reported \"I did not have any blood\".  Patient did not recall that she had fallen.  Patient was able to say \"I know I am in some sort of hospital\".  Currently patient reports some shortness of breath when she coughs.  She reports that \"I keep coughing until my brains come out, but I can't get it up\".  Patient does have a moist cough.  She denies feeling feverish or chilled.  She reports shortness of breath with her cough and is stable on room air satting 100%.  Denies having any chest pain or pain with breathing.  Patient reports no difficulty, coughing or choking with chewing and swallowing food and drink.  She reports overall that she is feeling fine and would like to know when she can get back to her home because \"I like to help people\".     Past Medical History  She has no past medical history on file.    Surgical History  She has no past " Ebonie is a 29 year old female here for an obstetrics check.  She is      . Gestational age: 36w2d     She reports fetal movement  She reports occasional contractions  She denies bleeding  She denies headaches  She reports edema  She denies abdominal pain  She denies rupture of membranes  She denies vision changes    See Prenatal Flowsheet for additional comments.   "surgical history on file.     Social History  She has no history on file for tobacco use, alcohol use, and drug use.    Family History  No family history on file.     Allergies  Patient has no known allergies.    Review of Systems  Review of Systems   Reason unable to perform ROS: patient is poor historian with conflicting answers.         Physical Exam    Constitutional:   Elderly, NAD, pleasantly confused and cooperative  HENT: Atraumatic, semimoist mucous membranes; hard of hearing  Eyes: PERRL, nonicteric  Neck: Supple, no JVD  Cardiovascular: S1, S2 distinct, regular, HR 60s to 70s, no murmur appreciated  Pulmonary: Fair air entry with scattered anterior rhonchi posterior right-sided inspiratory squeaks; slightly diminished bases; no crackles or wheezing noted; no conversational dyspnea  Abdominal: Soft, nontender, nondistended, + BS  Musculoskeletal: Fair range of motion with generalized weakness  Extremities:   No BLE edema  Lymphadenopathy: No nuchal LAP  Skin: Warm and dry  Neurological: Alert to self and to \"hospital\"; interactive with clear speech; patient able to answer some simple questions; gives conflicting answers to the same question and requires frequent prompting and redirecting; CNs grossly intact  Psychiatric: Appropriate mood and behavior     Last Recorded Vitals  Blood pressure (!) 153/93, pulse 68, temperature 36.9 °C (98.4 °F), temperature source Oral, resp. rate (!) 22, weight 49.9 kg (110 lb 0.2 oz), SpO2 99 %.    Relevant Results  Scheduled Medications:  ampicillin-sulbactam, 3 g, intravenous, q6h  apixaban, 5 mg, oral, BID  cholecalciferol, 1,000 Units, oral, Daily  cyanocobalamin, 500 mcg, oral, Daily  divalproex sprinkle, 125 mg, oral, BID  insulin lispro, 0-10 Units, subcutaneous, q4h  ipratropium-albuteroL, 3 mL, nebulization, TID  melatonin, 3 mg, oral, Daily  metoprolol tartrate, 25 mg, oral, Daily  pantoprazole, 40 mg, oral, q12h   Or  pantoprazole, 40 mg, intravenous, " q12h  QUEtiapine, 25 mg, oral, BID  sertraline, 25 mg, oral, Daily       PRN medications: acetaminophen **OR** acetaminophen **OR** acetaminophen, dextrose 10 % in water (D10W), dextrose, glucagon, ipratropium-albuteroL, ondansetron **OR** ondansetron      Results for orders placed or performed during the hospital encounter of 02/19/24 (from the past 24 hour(s))   ECG 12 lead   Result Value Ref Range    Ventricular Rate 61 BPM    QRS Duration 106 ms    QT Interval 430 ms    QTC Calculation(Bazett) 432 ms    R Axis -54 degrees    T Axis 1 degrees    QRS Count 10 beats    Q Onset 208 ms    T Offset 423 ms    QTC Fredericia 432 ms   Sars-CoV-2 PCR   Result Value Ref Range    Coronavirus 2019, PCR Not Detected Not Detected   Influenza A, and B PCR   Result Value Ref Range    Flu A Result Not Detected Not Detected    Flu B Result Not Detected Not Detected   Occult Blood, Stool    Specimen: Stool   Result Value Ref Range    Occult Blood, Stool X1 Negative Negative   POCT GLUCOSE   Result Value Ref Range    POCT Glucose 94 74 - 99 mg/dL   CBC   Result Value Ref Range    WBC 6.5 4.4 - 11.3 x10*3/uL    nRBC 0.0 0.0 - 0.0 /100 WBCs    RBC 4.00 4.00 - 5.20 x10*6/uL    Hemoglobin 11.1 (L) 12.0 - 16.0 g/dL    Hematocrit 37.9 36.0 - 46.0 %    MCV 95 80 - 100 fL    MCH 27.8 26.0 - 34.0 pg    MCHC 29.3 (L) 32.0 - 36.0 g/dL    RDW 12.8 11.5 - 14.5 %    Platelets 125 (L) 150 - 450 x10*3/uL   Basic metabolic panel   Result Value Ref Range    Glucose 75 74 - 99 mg/dL    Sodium 139 136 - 145 mmol/L    Potassium 4.5 3.5 - 5.3 mmol/L    Chloride 108 (H) 98 - 107 mmol/L    Bicarbonate 22 21 - 32 mmol/L    Anion Gap 14 10 - 20 mmol/L    Urea Nitrogen 25 (H) 6 - 23 mg/dL    Creatinine 1.02 0.50 - 1.05 mg/dL    eGFR 54 (L) >60 mL/min/1.73m*2    Calcium 8.7 8.6 - 10.3 mg/dL   Hemoglobin A1C   Result Value Ref Range    Hemoglobin A1C 5.1 see below %    Estimated Average Glucose 100 Not Established mg/dL   POCT GLUCOSE   Result Value Ref Range     POCT Glucose 75 74 - 99 mg/dL   POCT GLUCOSE   Result Value Ref Range    POCT Glucose 79 74 - 99 mg/dL      CT angio abdomen pelvis w and or wo IV IV contrast  Result Date: 2/19/2024  Interpreted By:  Donovan Gonzalez, STUDY: CT ANGIO ABDOMEN PELVIS W AND/OR WO IV IV CONTRAST;  2/19/2024 6:10 pm   INDICATION: Signs/Symptoms:Lower GI bleed evaluation.   COMPARISON: None.   ACCESSION NUMBER(S): [id number]   ORDERING CLINICIAN: SUZY EID   TECHNIQUE: Contiguous non-contrast axial images of the abdomen and pelvis were initially obtained.   Thin-section axial images of the abdomen and pelvis were obtained in the arterial and portal venous phase after intravenous administration of  mL Omnipaque 350 contrast. Sagittal and coronal reformatted images were provided. MIP images and 3D reconstructions were created on an independent workstation and reviewed.   FINDINGS: VASCULATURE:   ABDOMINAL AORTA: No abdominal aortic aneurysm or dissection. Mild abdominal aortic atherosclerosis.   ABDOMINAL and PELVIC ARTERIES:  No hemodynamically significant stenosis or occlusion.     CT ABDOMEN/PELVIS:   LOWER CHEST: There are multifocal peribronchovascular ground-glass opacities in the (right > left) lower lobes consistent with pneumonia, probably due to aspiration given the debris in the lower lobe bronchi with diffuse wall thickening. There is also mild diffuse interlobular septal thickening suggestive of interstitial edema. No pleural effusions. The heart is enlarged due to four-chamber dilatation, disproportionately affecting the atria.   ABDOMINAL WALL: Irregular scarring of the infraumbilical abdominal wall. Postsurgical changes of ventral hernia repair.   LIVER: No significant parenchymal abnormality.   BILE DUCTS: Minimal dilatation of the anterior segmental ducts in the right lobe. No dilatation of the extrahepatic bile ducts or elsewhere.   GALLBLADDER: Surgically absent.   SPLEEN: No significant abnormality.  Single calcified granuloma noted.   PANCREAS: No significant abnormality.   ADRENALS: A 0.6 cm hypodense nodule in the apex of the left adrenal gland is consistent with an adenoma. Normal right adrenal gland.   KIDNEYS, URETERS, BLADDER: There are multiple bilateral simple renal cysts and too small to characterize hypodense foci statistically representing benign cysts as well. No hydronephrosis.   REPRODUCTIVE ORGANS: No significant abnormality.   VESSELS: (See above). No additional significant abnormality.   LYMPH NODES/RETROPERITONEUM: No enlarged lymph nodes. No acute retroperitoneal abnormality.   BOWEL/MESENTERY/PERITONEUM: No gastrointestinal hemorrhage identified. No bowel wall thickening or dilatation.  Normal appendix.   No significant ascites, free air, or fluid collection.     OSSEOUS STRUCTURES: No acute osseous abnormality. Osteopenic bone. Healed fracture of the sacrococcygeal junction noted. There is grade 1 anterolisthesis of L5 on S1 and grade 1 retrolisthesis of L2 on L3, L3 on L4. There is severe lower lumbar facet arthropathy resulting in moderate to high-grade foraminal stenosis from L2-L3 through L5-S1. There is also severe bilateral lateral recess stenosis at L5-S1. Severe right hip osteoarthrosis.       1. No evidence of active gastrointestinal hemorrhage.   2. Small amount of aspiration pneumonia, as well as mild pulmonary edema in the lower lobes.       MACRO: None.   Signed by: Donovan Gonzalez 2/19/2024 6:45 PM Dictation workstation:   [id number]    ECG 12 lead  Result Date: 2/19/2024  Atrial fibrillation Left anterior fascicular block Left ventricular hypertrophy ( R in aVL , [place] product , Romhilt-Whitney ) Abnormal ECG No previous ECGs available    XR chest 2 views    Result Date: 2/19/2024  Interpreted By:  Serafin Pulido, STUDY: XR CHEST 2 VIEWS;  2/19/2024 3:16 pm   INDICATION: Signs/Symptoms:productive cough.   COMPARISON: None.   ACCESSION NUMBER(S): [id number]   ORDERING  CLINICIAN: SUZY EID   FINDINGS:   The cardiac silhouette is unremarkable. Costophrenic angles are sharp. Calcified granuloma in the right upper lung. Lungs are otherwise clear.. The trachea is midline. There is no pneumothorax. No acute osseous abnormality is seen.       1.  No active cardiopulmonary process.     Signed by: Serafin Pulido 2/19/2024 3:18 PM Dictation workstation:   [id number]      Assessment/Plan   Arielle Alcaraz is a 88 y.o. female with past medical history of dementia (baseline oriented x 1), HTN, HF, A-fib (on apixaban), gout, hypothyroidism, iron deficiency anemia and diabetes presented to the ED from her skilled facility reporting the patient had fallen the day before, denying LOC, and noting bright red and dark red blood in the patient's stool.  Nursing home staff additionally reported a productive cough with the patient reported no acute complaints.  EKG showed A-fib without acute ischemia.  Labs with hemoglobin 11.8, occult stool negative. No leukocytosis; negative for flu and COVID.  CXR negative.  CT A/P showing multifocal peribronchovascular GGO's in b/l lower lobes, concerning for pneumonia.  Treatment was started with Unasyn and patient was admitted for further evaluation.  Pulmonology is consulted for aspiration pneumonia.    Impressions:  # Pneumonia; community-acquired, likely aspiration with CT A/P showing bilateral GGO's with lower lobe bronchi debris with diffuse wall thickening; moist cough with rhonchi and squeeks  #? Aspiration: poor recall and falls with advanced dementia; debris in bronchi on CT  #Atrial fib: on Eliquis; rate controlled  # Heart Failure: Listed in history; no primary or cardiology notes or echo on file    Recommendations:  -Continue Unasyn 3 g every 6 hours for 5 days  -Adding DuoNebs  -BPH with Acapella  -Evaluation by SLP for aspiration  -Resume Eliquis once cleared by GI    Thank you for the consult.  Pulmonology will follow peripherally.    Amina  Chaparro, APRN-CNS

## 2024-02-20 NOTE — PROGRESS NOTES
"Speech-Language Pathology    Inpatient Speech-Language Pathology Clinical Swallow Evaluation    Patient Name: Arielle Alcaraz  MRN: [id number]  : 1935  Today's Date: 24  Time Calculation  Start Time: 1341  Stop Time: 1354  Time Calculation (min): 13 min       Recommendations:  Regular diet/thin liquids  -upright posture  -slow rate    Subjective:  History and Physical: 87 yo female admitted for GI bleed.  H/o HTN, DM2, ROXIE, hypothyroidism, chronic A Fib, dementia    Relevant SLP history: questionable aspiration pna    General Info:  Reason for Referral: Concern for aspiration  Past Medical History Relevant to Rehab: GI bleed  Prior Level of Function: Decreased function  [place] Diet: Regular diet  Dysphagia Diagnosis: Within functional limits, Mild oral stage dysphagia    Objective:  Oral Mechanism Eval: WFL, edentulous on bottom     Clinical Observations: Clinical swallow evaluation completed.  Pt alert and oriented x1. Pt consumed trials of thin liquids, puree and solid textures.  Functional mastication and bolus formation noted with no significant oral stasis.  Seemingly functional AP transfer.  Hyolaryngeal elevation/excursion palpated and appears functional.  No overt s/s aspiration noted with single and consecutive cup and straw drinks of thin water.  No s/s aspiration with puree and solids.  Suspect possible esophageal dysphagia, possible reflux resulting in aspiration.  Pt reports \"feels like it's coming back up\"      Diet Recommendations: Regular diet/thin liquids             Assessment: Pt presents with suspected esophageal dysphagia    Plan: No skilled ST needs               GOALS:  Pt. to tolerate least restrictive diet without pulmonary compromise    Education:   Pt. Educated on purpose of assessment, recommended diet and recommended safe swallow strategies              "

## 2024-02-20 NOTE — PROGRESS NOTES
Speech-Language Pathology                 Therapy Communication Note    Patient Name: Arielle Alcaraz  MRN: [id number]  Today's Date: 2/20/2024     Discipline: Speech Language Pathology    Missed Visit Reason: Missed Time Reason: NPO    Missed Time: Attempt    Comment: NPO for GI consult

## 2024-02-20 NOTE — CONSULTS
Inpatient consult to gastroenterology  Consult performed by: TANIKA Morris-CNP  Consult ordered by: Everardo Díza DO  Reason for consult: GIB      Gastroenterology Consultation Note    ASSESSMENT and PLAN:     Arielle Alcaraz is a 88 y.o. female with a past medical history of HTN, DM 2, ROXIE, hypothyroidism, chronic A-fib on Eliquis, dementia who presented with SNP reports of melena.  GI consulted for GIB    No signs of acute GIB.  Occult blood negative, hgb at baseline, brown stool. Consider hemorrhoids or vaginal bleeding    - recommend metamucil    GI signing off     Kath Cameron CNP    HISTORY OF PRESENT ILLNESS:     History Of Present Illness:    Arielle Alcaraz is a 88 y.o. female who presented to the hospital from SNF with reports of melena and dark red tarry stools.  Patient unable to provide history due to her dementia/A&O x 1.  Hemoglobin stable around 11.  CTA yesterday was negative.  Occult blood negative. No history of colonoscopy available     Relevant endoscopic evaluation results were personally reviewed.    Review of systems:   Review of Systems   Unable to perform ROS: Dementia      A 10+ point ROS was completed and otherwise negative except as noted and per HPI.    PAST HISTORIES:     Past Medical History:  No past medical history on file.    Past Surgical History:  No past surgical history on file.     Family History:  No family history on file.     Social History:   has no history on file for tobacco use, alcohol use, and drug use.    OBJECTIVE:     Last Recorded Vitals:  Vitals:    02/20/24 0900 02/20/24 0930 02/20/24 1030 02/20/24 1229   BP: 160/54 134/58 112/65 (!) 153/93   Pulse: 56 60 50 68   Resp: 14 17 18 (!) 22   Temp:       TempSrc:       SpO2: (!) 93% 94% (!) 93% 99%   Weight:           Physical Exam:  Physical Exam  Constitutional:       Appearance: Normal appearance. She is normal weight.   HENT:      Mouth/Throat:      Mouth: Mucous membranes are dry.      Pharynx: Oropharynx is  clear.   Cardiovascular:      Rate and Rhythm: Normal rate and regular rhythm.   Pulmonary:      Effort: Pulmonary effort is normal.      Breath sounds: Normal breath sounds. No wheezing or rhonchi.   Abdominal:      General: Abdomen is flat. Bowel sounds are normal. There is no distension.      Palpations: Abdomen is soft. There is no hepatomegaly.      Tenderness: There is no abdominal tenderness. There is no guarding or rebound. Negative signs include Carter's sign.      Hernia: No hernia is present.   Musculoskeletal:         General: Normal range of motion.   Skin:     General: Skin is warm and dry.   Neurological:      General: No focal deficit present.      Mental Status: She is alert. Mental status is at baseline. She is disoriented.   Psychiatric:         Mood and Affect: Mood normal.         Behavior: Behavior normal.           Allergies:  No Known Allergies    Inpatient Medications:  ampicillin-sulbactam, 3 g, intravenous, q6h  apixaban, 5 mg, oral, BID  cholecalciferol, 1,000 Units, oral, Daily  cyanocobalamin, 500 mcg, oral, Daily  divalproex sprinkle, 125 mg, oral, BID  insulin lispro, 0-10 Units, subcutaneous, q4h  ipratropium-albuteroL, 3 mL, nebulization, TID  melatonin, 3 mg, oral, Daily  metoprolol tartrate, 25 mg, oral, Daily  pantoprazole, 40 mg, oral, q12h   Or  pantoprazole, 40 mg, intravenous, q12h  QUEtiapine, 25 mg, oral, BID  sertraline, 25 mg, oral, Daily      sodium chloride 0.9%, 100 mL/hr, Last Rate: 100 mL/hr (02/20/24 0100)      PRN medications: acetaminophen **OR** acetaminophen **OR** acetaminophen, dextrose 10 % in water (D10W), dextrose, glucagon, ipratropium-albuteroL, ondansetron **OR** ondansetron    Outpatient Medications:  Prior to Admission medications    Medication Sig Start Date End Date Taking? Authorizing Provider   acetaminophen (Tylenol) 500 mg tablet Take 2 tablets (1,000 mg) by mouth 2 times a day as needed for mild pain (1 - 3).    Historical Provider, MD    apixaban (Eliquis) 5 mg tablet Take 1 tablet (5 mg) by mouth 2 times a day.    Historical Provider, MD   cholecalciferol (Vitamin D-3) 25 MCG (1000 UT) tablet Take 1 tablet (1,000 Units) by mouth once daily.    Historical Provider, MD   cyanocobalamin (Vitamin B-12) 500 mcg tablet Take 1 tablet (500 mcg) by mouth once daily.    Historical Provider, MD   divalproex sprinkle (Depakote Sprinkle) 125 mg DR capsule Take 1 capsule (125 mg) by mouth 2 times a day.    Historical Provider, MD   esomeprazole (NexIUM) 20 mg DR capsule Take 1 capsule (20 mg) by mouth once daily in the morning. Take before meals. Do not open capsule. Take 30 minutes before eating.    Historical Provider, MD   lisinopril 10 mg tablet Take 1 tablet (10 mg) by mouth once daily.    Historical Provider, MD   metoprolol tartrate (Lopressor) 25 mg tablet Take 1 tablet (25 mg) by mouth once daily.    Historical Provider, MD   multivitamin-children's (Cerovite, Jr) chewable tablet Chew 1 tablet once daily.    Historical Provider, MD   QUEtiapine (SEROquel) 25 mg tablet Take 1 tablet (25 mg) by mouth 2 times a day.    Historical Provider, MD   sertraline (Zoloft) 25 mg tablet Take 1 tablet (25 mg) by mouth once daily.    Historical Provider, MD       LABS AND IMAGING:     Labs:  Results for orders placed or performed during the hospital encounter of 02/19/24 (from the past 24 hour(s))   CBC and Auto Differential   Result Value Ref Range    WBC 5.2 4.4 - 11.3 x10*3/uL    nRBC 0.0 0.0 - 0.0 /100 WBCs    RBC 4.17 4.00 - 5.20 x10*6/uL    Hemoglobin 11.8 (L) 12.0 - 16.0 g/dL    Hematocrit 37.9 36.0 - 46.0 %    MCV 91 80 - 100 fL    MCH 28.3 26.0 - 34.0 pg    MCHC 31.1 (L) 32.0 - 36.0 g/dL    RDW 12.6 11.5 - 14.5 %    Platelets 139 (L) 150 - 450 x10*3/uL    Neutrophils % 70.9 40.0 - 80.0 %    Immature Granulocytes %, Automated 1.7 (H) 0.0 - 0.9 %    Lymphocytes % 16.2 13.0 - 44.0 %    Monocytes % 10.0 2.0 - 10.0 %    Eosinophils % 0.6 0.0 - 6.0 %    Basophils  % 0.6 0.0 - 2.0 %    Neutrophils Absolute 3.68 1.60 - 5.50 x10*3/uL    Immature Granulocytes Absolute, Automated 0.09 0.00 - 0.50 x10*3/uL    Lymphocytes Absolute 0.84 0.80 - 3.00 x10*3/uL    Monocytes Absolute 0.52 0.05 - 0.80 x10*3/uL    Eosinophils Absolute 0.03 0.00 - 0.40 x10*3/uL    Basophils Absolute 0.03 0.00 - 0.10 x10*3/uL   Comprehensive metabolic panel   Result Value Ref Range    Glucose 96 74 - 99 mg/dL    Sodium 139 136 - 145 mmol/L    Potassium 4.6 3.5 - 5.3 mmol/L    Chloride 108 (H) 98 - 107 mmol/L    Bicarbonate 25 21 - 32 mmol/L    Anion Gap 11 10 - 20 mmol/L    Urea Nitrogen 33 (H) 6 - 23 mg/dL    Creatinine 0.97 0.50 - 1.05 mg/dL    eGFR 57 (L) >60 mL/min/1.73m*2    Calcium 8.7 8.6 - 10.3 mg/dL    Albumin 3.5 3.4 - 5.0 g/dL    Alkaline Phosphatase 77 33 - 136 U/L    Total Protein 6.9 6.4 - 8.2 g/dL    AST 21 9 - 39 U/L    Bilirubin, Total 0.4 0.0 - 1.2 mg/dL    ALT 8 7 - 45 U/L   ECG 12 lead   Result Value Ref Range    Ventricular Rate 61 BPM    QRS Duration 106 ms    QT Interval 430 ms    QTC Calculation(Bazett) 432 ms    R Axis -54 degrees    T Axis 1 degrees    QRS Count 10 beats    Q Onset 208 ms    T Offset 423 ms    QTC Fredericia 432 ms   Sars-CoV-2 PCR   Result Value Ref Range    Coronavirus 2019, PCR Not Detected Not Detected   Influenza A, and B PCR   Result Value Ref Range    Flu A Result Not Detected Not Detected    Flu B Result Not Detected Not Detected   Occult Blood, Stool    Specimen: Stool   Result Value Ref Range    Occult Blood, Stool X1 Negative Negative   POCT GLUCOSE   Result Value Ref Range    POCT Glucose 94 74 - 99 mg/dL   CBC   Result Value Ref Range    WBC 6.5 4.4 - 11.3 x10*3/uL    nRBC 0.0 0.0 - 0.0 /100 WBCs    RBC 4.00 4.00 - 5.20 x10*6/uL    Hemoglobin 11.1 (L) 12.0 - 16.0 g/dL    Hematocrit 37.9 36.0 - 46.0 %    MCV 95 80 - 100 fL    MCH 27.8 26.0 - 34.0 pg    MCHC 29.3 (L) 32.0 - 36.0 g/dL    RDW 12.8 11.5 - 14.5 %    Platelets 125 (L) 150 - 450 x10*3/uL   Basic  metabolic panel   Result Value Ref Range    Glucose 75 74 - 99 mg/dL    Sodium 139 136 - 145 mmol/L    Potassium 4.5 3.5 - 5.3 mmol/L    Chloride 108 (H) 98 - 107 mmol/L    Bicarbonate 22 21 - 32 mmol/L    Anion Gap 14 10 - 20 mmol/L    Urea Nitrogen 25 (H) 6 - 23 mg/dL    Creatinine 1.02 0.50 - 1.05 mg/dL    eGFR 54 (L) >60 mL/min/1.73m*2    Calcium 8.7 8.6 - 10.3 mg/dL   POCT GLUCOSE   Result Value Ref Range    POCT Glucose 75 74 - 99 mg/dL   POCT GLUCOSE   Result Value Ref Range    POCT Glucose 79 74 - 99 mg/dL        Relevant imaging results were personally reviewed.

## 2024-02-20 NOTE — PROGRESS NOTES
Pharmacy Medication History Review    Arielle Alcaraz is a 88 y.o. female admitted for Gastrointestinal hemorrhage, unspecified gastrointestinal hemorrhage type. Pharmacy reviewed the patient's [id number] medications and allergies for accuracy.    The list below reflectives the updated PTA list. Please review each medication in order reconciliation for additional clarification and justification.  Prior to Admission Medications   Prescriptions Last Dose Informant Patient Reported? Taking?   QUEtiapine (SEROquel) 25 mg tablet   Yes No   Sig: Take 1 tablet (25 mg) by mouth 2 times a day.   acetaminophen (Tylenol) 500 mg tablet   Yes No   Sig: Take 2 tablets (1,000 mg) by mouth 2 times a day as needed for mild pain (1 - 3).   apixaban (Eliquis) 5 mg tablet   Yes No   Sig: Take 1 tablet (5 mg) by mouth 2 times a day.   cholecalciferol (Vitamin D-3) 25 MCG (1000 UT) tablet   Yes No   Sig: Take 1 tablet (1,000 Units) by mouth once daily.   cyanocobalamin (Vitamin B-12) 500 mcg tablet   Yes No   Sig: Take 1 tablet (500 mcg) by mouth once daily.   divalproex sprinkle (Depakote Sprinkle) 125 mg DR capsule   Yes No   Sig: Take 1 capsule (125 mg) by mouth 2 times a day.   esomeprazole (NexIUM) 20 mg DR capsule   Yes No   Sig: Take 1 capsule (20 mg) by mouth once daily in the morning. Take before meals. Do not open capsule. Take 30 minutes before eating.   lisinopril 10 mg tablet   Yes No   Sig: Take 1 tablet (10 mg) by mouth once daily.   metoprolol tartrate (Lopressor) 25 mg tablet   Yes No   Sig: Take 1 tablet (25 mg) by mouth once daily.   multivitamin-children's (Cerovite, Jr) chewable tablet   Yes No   Sig: Chew 1 tablet once daily.   sertraline (Zoloft) 25 mg tablet   Yes No   Sig: Take 1 tablet (25 mg) by mouth once daily.      Facility-Administered Medications: None      Allergies  Reviewed by Becky Wei, EMT on 2/19/2024   No Known Allergies         Below are additional concerns with the patient's PTA  list.  Medication lest sent from facility.    Maria De Jesus Wang CPhT

## 2024-02-20 NOTE — PROGRESS NOTES
Transitional Care Coordination Progress Note:  Plan per Medical/Surgical team: treatment of asp PN & rectal bleed with IV ATB, IV protonix, IV fluids, NPO, pulm & GI consult  Status: Inpatient  Payor source: [employer]  [facility]  Potential Barriers: /101  ADOD: 2/22/2024  JHON Michael RN, BSN Transitional Care Coordinator ED# [phone]      02/20/24 0853   Discharge Planning   Living Arrangements Alone   Support Systems Family members   Assistance Needed GI work up   Type of Residence Skilled nursing facility   Do you have animals or pets at home? No   Home or Post Acute Services Post acute facilities (Rehab/SNF/etc)   Type of Post Acute Facility Services Skilled nursing   Patient expects to be discharged to: [facility]   Does the patient need discharge transport arranged? Yes   RoundTrip coordination needed? Yes   Has discharge transport been arranged? No   Financial Resource Strain   How hard is it for you to pay for the very basics like food, housing, medical care, and heating? Not hard   Housing Stability   In the last 12 months, was there a time when you were not able to pay the mortgage or rent on time? N   In the last 12 months, how many places have you lived? 1   In the last 12 months, was there a time when you did not have a steady place to sleep or slept in a shelter (including now)? N   Transportation Needs   In the past 12 months, has lack of transportation kept you from medical appointments or from getting medications? no   In the past 12 months, has lack of transportation kept you from meetings, work, or from getting things needed for daily living? No   Patient Choice   Provider Choice list and CMS website (https://medicare.gov/care-compare#search) for post-acute Quality and Resource Measure Data were provided and reviewed with: Family;Patient   Patient / Family choosing to utilize agency / facility established prior to hospitalization  Yes

## 2024-02-20 NOTE — PROGRESS NOTES
02/20/24 0852   [facility] Disability Status   Are you deaf or do you have serious difficulty hearing? N   Are you blind or do you have serious difficulty seeing, even when wearing glasses? N   Because of a physical, mental, or emotional condition, do you have serious difficulty concentrating, remembering, or making decisions? (5 years old or older) Y   Do you have serious difficulty walking or climbing stairs? Y   Do you have serious difficulty dressing or bathing? Y   Because of a physical, mental, or emotional condition, do you have serious difficulty doing errands alone such as visiting the doctor? Y

## 2024-02-21 ENCOUNTER — APPOINTMENT (OUTPATIENT)
Dept: CARDIOLOGY | Facility: HOSPITAL | Age: 89
DRG: 178 | End: 2024-02-21
Payer: COMMERCIAL

## 2024-02-21 ENCOUNTER — APPOINTMENT (OUTPATIENT)
Dept: RADIOLOGY | Facility: HOSPITAL | Age: 89
DRG: 178 | End: 2024-02-21
Payer: COMMERCIAL

## 2024-02-21 LAB
ALBUMIN SERPL BCP-MCNC: 2.8 G/DL (ref 3.4–5)
ALP SERPL-CCNC: 66 U/L (ref 33–136)
ALT SERPL W P-5'-P-CCNC: 7 U/L (ref 7–45)
AST SERPL W P-5'-P-CCNC: 15 U/L (ref 9–39)
BASOPHILS # BLD AUTO: 0.04 X10*3/UL (ref 0–0.1)
BASOPHILS NFR BLD AUTO: 0.7 %
BILIRUB DIRECT SERPL-MCNC: 0.1 MG/DL (ref 0–0.3)
BILIRUB SERPL-MCNC: 0.3 MG/DL (ref 0–1.2)
CARDIAC TROPONIN I PNL SERPL HS: 15 NG/L (ref 0–13)
EOSINOPHIL # BLD AUTO: 0.15 X10*3/UL (ref 0–0.4)
EOSINOPHIL NFR BLD AUTO: 2.7 %
ERYTHROCYTE [DISTWIDTH] IN BLOOD BY AUTOMATED COUNT: 12.6 % (ref 11.5–14.5)
FOLATE SERPL-MCNC: >24 NG/ML
GLUCOSE BLD MANUAL STRIP-MCNC: 100 MG/DL (ref 74–99)
GLUCOSE BLD MANUAL STRIP-MCNC: 76 MG/DL (ref 74–99)
GLUCOSE BLD MANUAL STRIP-MCNC: 78 MG/DL (ref 74–99)
GLUCOSE BLD MANUAL STRIP-MCNC: 80 MG/DL (ref 74–99)
GLUCOSE BLD MANUAL STRIP-MCNC: 81 MG/DL (ref 74–99)
GLUCOSE BLD MANUAL STRIP-MCNC: 86 MG/DL (ref 74–99)
GLUCOSE BLD MANUAL STRIP-MCNC: 89 MG/DL (ref 74–99)
GLUCOSE BLD MANUAL STRIP-MCNC: 98 MG/DL (ref 74–99)
HAV IGM SER QL: NONREACTIVE
HBV CORE IGM SER QL: NONREACTIVE
HBV SURFACE AG SERPL QL IA: NONREACTIVE
HCT VFR BLD AUTO: 36 % (ref 36–46)
HCV AB SER QL: NONREACTIVE
HGB BLD-MCNC: 10.5 G/DL (ref 12–16)
HIV 1+2 AB+HIV1 P24 AG SERPL QL IA: NONREACTIVE
HOLD SPECIMEN: NORMAL
IMM GRANULOCYTES # BLD AUTO: 0.01 X10*3/UL (ref 0–0.5)
IMM GRANULOCYTES NFR BLD AUTO: 0.2 % (ref 0–0.9)
LYMPHOCYTES # BLD AUTO: 1.96 X10*3/UL (ref 0.8–3)
LYMPHOCYTES NFR BLD AUTO: 34.7 %
MCH RBC QN AUTO: 28.4 PG (ref 26–34)
MCHC RBC AUTO-ENTMCNC: 29.2 G/DL (ref 32–36)
MCV RBC AUTO: 97 FL (ref 80–100)
MONOCYTES # BLD AUTO: 0.78 X10*3/UL (ref 0.05–0.8)
MONOCYTES NFR BLD AUTO: 13.8 %
NEUTROPHILS # BLD AUTO: 2.71 X10*3/UL (ref 1.6–5.5)
NEUTROPHILS NFR BLD AUTO: 47.9 %
NRBC BLD-RTO: 0 /100 WBCS (ref 0–0)
PLATELET # BLD AUTO: 119 X10*3/UL (ref 150–450)
PROT SERPL-MCNC: 5.6 G/DL (ref 6.4–8.2)
RBC # BLD AUTO: 3.7 X10*6/UL (ref 4–5.2)
VIT B12 SERPL-MCNC: 865 PG/ML (ref 211–911)
WBC # BLD AUTO: 5.7 X10*3/UL (ref 4.4–11.3)

## 2024-02-21 PROCEDURE — 99232 SBSQ HOSP IP/OBS MODERATE 35: CPT | Performed by: HOSPITALIST

## 2024-02-21 PROCEDURE — 36415 COLL VENOUS BLD VENIPUNCTURE: CPT | Performed by: HOSPITALIST

## 2024-02-21 PROCEDURE — 80074 ACUTE HEPATITIS PANEL: CPT | Mod: AHULAB | Performed by: HOSPITALIST

## 2024-02-21 PROCEDURE — 80076 HEPATIC FUNCTION PANEL: CPT | Performed by: HOSPITALIST

## 2024-02-21 PROCEDURE — 1200000002 HC GENERAL ROOM WITH TELEMETRY DAILY

## 2024-02-21 PROCEDURE — 82947 ASSAY GLUCOSE BLOOD QUANT: CPT

## 2024-02-21 PROCEDURE — 71045 X-RAY EXAM CHEST 1 VIEW: CPT

## 2024-02-21 PROCEDURE — 2500000001 HC RX 250 WO HCPCS SELF ADMINISTERED DRUGS (ALT 637 FOR MEDICARE OP): Performed by: HOSPITALIST

## 2024-02-21 PROCEDURE — 93005 ELECTROCARDIOGRAM TRACING: CPT

## 2024-02-21 PROCEDURE — 84484 ASSAY OF TROPONIN QUANT: CPT | Performed by: HOSPITALIST

## 2024-02-21 PROCEDURE — 71045 X-RAY EXAM CHEST 1 VIEW: CPT | Performed by: RADIOLOGY

## 2024-02-21 PROCEDURE — 93010 ELECTROCARDIOGRAM REPORT: CPT | Performed by: STUDENT IN AN ORGANIZED HEALTH CARE EDUCATION/TRAINING PROGRAM

## 2024-02-21 PROCEDURE — 2500000004 HC RX 250 GENERAL PHARMACY W/ HCPCS (ALT 636 FOR OP/ED): Performed by: INTERNAL MEDICINE

## 2024-02-21 PROCEDURE — 94668 MNPJ CHEST WALL SBSQ: CPT

## 2024-02-21 PROCEDURE — 85025 COMPLETE CBC W/AUTO DIFF WBC: CPT | Performed by: HOSPITALIST

## 2024-02-21 PROCEDURE — 2500000002 HC RX 250 W HCPCS SELF ADMINISTERED DRUGS (ALT 637 FOR MEDICARE OP, ALT 636 FOR OP/ED): Mod: MUE | Performed by: HOSPITALIST

## 2024-02-21 PROCEDURE — 94640 AIRWAY INHALATION TREATMENT: CPT | Mod: MUE

## 2024-02-21 PROCEDURE — 2500000001 HC RX 250 WO HCPCS SELF ADMINISTERED DRUGS (ALT 637 FOR MEDICARE OP): Performed by: INTERNAL MEDICINE

## 2024-02-21 PROCEDURE — 2500000002 HC RX 250 W HCPCS SELF ADMINISTERED DRUGS (ALT 637 FOR MEDICARE OP, ALT 636 FOR OP/ED): Mod: MUE | Performed by: INTERNAL MEDICINE

## 2024-02-21 RX ORDER — INSULIN LISPRO 100 [IU]/ML
0-10 INJECTION, SOLUTION INTRAVENOUS; SUBCUTANEOUS
Status: DISCONTINUED | OUTPATIENT
Start: 2024-02-21 | End: 2024-02-22 | Stop reason: HOSPADM

## 2024-02-21 RX ADMIN — APIXABAN 5 MG: 5 TABLET, FILM COATED ORAL at 11:07

## 2024-02-21 RX ADMIN — ACETAMINOPHEN 650 MG: 325 TABLET ORAL at 14:36

## 2024-02-21 RX ADMIN — SODIUM CHLORIDE 100 ML/HR: 900 INJECTION INTRAVENOUS at 13:55

## 2024-02-21 RX ADMIN — Medication 3 MG: at 20:39

## 2024-02-21 RX ADMIN — DIVALPROEX SODIUM 125 MG: 125 CAPSULE, COATED PELLETS ORAL at 11:07

## 2024-02-21 RX ADMIN — PSYLLIUM HUSK 1 PACKET: 3.4 POWDER ORAL at 20:39

## 2024-02-21 RX ADMIN — IPRATROPIUM BROMIDE AND ALBUTEROL SULFATE 3 ML: 2.5; .5 SOLUTION RESPIRATORY (INHALATION) at 19:54

## 2024-02-21 RX ADMIN — SODIUM CHLORIDE 100 ML/HR: 900 INJECTION INTRAVENOUS at 05:16

## 2024-02-21 RX ADMIN — APIXABAN 5 MG: 5 TABLET, FILM COATED ORAL at 20:39

## 2024-02-21 RX ADMIN — PANTOPRAZOLE SODIUM 40 MG: 40 TABLET, DELAYED RELEASE ORAL at 22:45

## 2024-02-21 RX ADMIN — Medication 1000 UNITS: at 11:07

## 2024-02-21 RX ADMIN — AMPICILLIN SODIUM AND SULBACTAM SODIUM 3 G: 2; 1 INJECTION, POWDER, FOR SOLUTION INTRAMUSCULAR; INTRAVENOUS at 11:08

## 2024-02-21 RX ADMIN — CYANOCOBALAMIN TAB 500 MCG 500 MCG: 500 TAB at 11:07

## 2024-02-21 RX ADMIN — IPRATROPIUM BROMIDE AND ALBUTEROL SULFATE 3 ML: 2.5; .5 SOLUTION RESPIRATORY (INHALATION) at 07:50

## 2024-02-21 RX ADMIN — AMPICILLIN SODIUM AND SULBACTAM SODIUM 3 G: 2; 1 INJECTION, POWDER, FOR SOLUTION INTRAMUSCULAR; INTRAVENOUS at 05:17

## 2024-02-21 RX ADMIN — SERTRALINE HYDROCHLORIDE 25 MG: 50 TABLET ORAL at 11:07

## 2024-02-21 RX ADMIN — AMPICILLIN SODIUM AND SULBACTAM SODIUM 3 G: 2; 1 INJECTION, POWDER, FOR SOLUTION INTRAMUSCULAR; INTRAVENOUS at 22:15

## 2024-02-21 RX ADMIN — QUETIAPINE FUMARATE 25 MG: 25 TABLET ORAL at 20:39

## 2024-02-21 RX ADMIN — DIVALPROEX SODIUM 125 MG: 125 CAPSULE, COATED PELLETS ORAL at 20:39

## 2024-02-21 RX ADMIN — PANTOPRAZOLE SODIUM 40 MG: 40 TABLET, DELAYED RELEASE ORAL at 11:07

## 2024-02-21 RX ADMIN — METOPROLOL TARTRATE 25 MG: 25 TABLET, FILM COATED ORAL at 11:07

## 2024-02-21 RX ADMIN — QUETIAPINE FUMARATE 25 MG: 25 TABLET ORAL at 11:07

## 2024-02-21 RX ADMIN — AMPICILLIN SODIUM AND SULBACTAM SODIUM 3 G: 2; 1 INJECTION, POWDER, FOR SOLUTION INTRAMUSCULAR; INTRAVENOUS at 16:37

## 2024-02-21 ASSESSMENT — COGNITIVE AND FUNCTIONAL STATUS - GENERAL
WALKING IN HOSPITAL ROOM: A LITTLE
MOVING TO AND FROM BED TO CHAIR: A LITTLE
CLIMB 3 TO 5 STEPS WITH RAILING: A LOT
MOBILITY SCORE: 17
PERSONAL GROOMING: A LITTLE
STANDING UP FROM CHAIR USING ARMS: A LOT
DAILY ACTIVITIY SCORE: 17
EATING MEALS: A LITTLE
TURNING FROM BACK TO SIDE WHILE IN FLAT BAD: A LITTLE
DRESSING REGULAR LOWER BODY CLOTHING: A LITTLE
TOILETING: A LITTLE
DRESSING REGULAR UPPER BODY CLOTHING: A LITTLE
HELP NEEDED FOR BATHING: A LOT

## 2024-02-21 ASSESSMENT — PAIN SCALES - GENERAL
PAINLEVEL_OUTOF10: 0 - NO PAIN
PAINLEVEL_OUTOF10: 0 - NO PAIN

## 2024-02-21 ASSESSMENT — PAIN SCALES - PAIN ASSESSMENT IN ADVANCED DEMENTIA (PAINAD)
NEGVOCALIZATION: OCCASIONAL MOAN/GROAN, LOW SPEECH, NEGATIVE/DISAPPROVING QUALITY
CONSOLABILITY: NO NEED TO CONSOLE
TOTALSCORE: 2
BREATHING: NORMAL
FACIALEXPRESSION: SMILING OR INEXPRESSIVE
BODYLANGUAGE: TENSE, DISTRESSED PACING, FIDGETING

## 2024-02-21 ASSESSMENT — PAIN - FUNCTIONAL ASSESSMENT: PAIN_FUNCTIONAL_ASSESSMENT: 0-10

## 2024-02-21 NOTE — CONSULTS
Nutrition Assessment Note  Nutrition Assessment      Reason for Assessment  Reason for Assessment: Provider consult order  Hospital day #2 for GI hemorrhage. Planned discharge today to SNF, but patient with c/o chest pain, so discharge held.      History:  Food and Nutrient History  Energy Intake: Fair 50-75 %  Food and Nutrient History: Patietn with dementia, no family in room to interview.  Staff reports fair appetite while here.  Able to feed herself and tolerates consistency without issues.     PMH: HTN, DM2, dementia, hypothyroid, Afib     Anthropometrics:  Height: 152.4 cm (5')  Weight: 49.9 kg (110 lb 0.2 oz)  BMI (Calculated): 21.48  Weight Change  Weight History / % Weight Change: n/a  IBW/kg (Dietitian Calculated): 45.5 kg     Energy Needs:  Calculated Energy Needs Using Equations  Height: 152.4 cm (5')  Temp: 36.6 °C (97.9 °F)    Estimated Energy Needs  Method for Estimating Needs: 2117-2186 @ 27-30 kcal/kg    Estimated Protein Needs  Method for Estimating Needs: 36-45 @ .8-1.0 gr/kg    Estimated Fluid Needs  Total Fluid Estimated Needs (mL): 1150 mL  Total Fluid Estimated Needs (mL/kg): 25 mL/kg     Dietary Orders   Adult diet Regular      Nutrition Focused Physical Findings:  Subcutaneous Fat Loss  Orbital Fat Pads: Mild-Moderate (slight dark circles and slight hollowing)  Buccal Fat Pads: Mild-Moderate (flat cheeks, minimal bounce)    Muscle Wasting  Temporalis: Mild-Moderate (slight depression)  Pectoralis (Clavicular Region): Well nourished (clavicle not visible)  Gastrocnemius: Well nourished (well developed bulbous muscle)    Results   POCT GLUCOSE  Result Value Ref Range   POCT Glucose 84 74 - 99 mg/dL  POCT GLUCOSE  Result Value Ref Range   POCT Glucose 89 74 - 99 mg/dL  POCT GLUCOSE  Result Value Ref Range   POCT Glucose 78 74 - 99 mg/dL  POCT GLUCOSE  Result Value Ref Range   POCT Glucose 80 74 - 99 mg/dL  CBC and Auto Differential  Result Value Ref Range   WBC 5.7 4.4 - 11.3  x10*3/uL   nRBC 0.0 0.0 - 0.0 /100 WBCs   RBC 3.70 (L) 4.00 - 5.20 x10*6/uL   Hemoglobin 10.5 (L) 12.0 - 16.0 g/dL   Hematocrit 36.0 36.0 - 46.0 %   MCV 97 80 - 100 fL   MCH 28.4 26.0 - 34.0 pg   MCHC 29.2 (L) 32.0 - 36.0 g/dL   RDW 12.6 11.5 - 14.5 %   Platelets 119 (L) 150 - 450 x10*3/uL   Neutrophils % 47.9 40.0 - 80.0 %   Immature Granulocytes %, Automated 0.2 0.0 - 0.9 %   Lymphocytes % 34.7 13.0 - 44.0 %   Monocytes % 13.8 2.0 - 10.0 %   Eosinophils % 2.7 0.0 - 6.0 %   Basophils % 0.7 0.0 - 2.0 %   Neutrophils Absolute 2.71 1.60 - 5.50 x10*3/uL   Immature Granulocytes Absolute, Automated 0.01 0.00 - 0.50 x10*3/uL   Lymphocytes Absolute 1.96 0.80 - 3.00 x10*3/uL   Monocytes Absolute 0.78 0.05 - 0.80 x10*3/uL   Eosinophils Absolute 0.15 0.00 - 0.40 x10*3/uL   Basophils Absolute 0.04 0.00 - 0.10 x10*3/uL  Hepatic function panel  Result Value Ref Range   Albumin 2.8 (L) 3.4 - 5.0 g/dL   Bilirubin, Total 0.3 0.0 - 1.2 mg/dL   Bilirubin, Direct 0.1 0.0 - 0.3 mg/dL   Alkaline Phosphatase 66 33 - 136 U/L   ALT 7 7 - 45 U/L   AST 15 9 - 39 U/L   Total Protein 5.6 (L) 6.4 - 8.2 g/dL  POCT GLUCOSE  Result Value Ref Range   POCT Glucose 81 74 - 99 mg/dL  POCT GLUCOSE  Result Value Ref Range   POCT Glucose 98 74 - 99 mg/dL  POCT GLUCOSE  Result Value Ref Range   POCT Glucose 86 74 - 99 mg/dL        Nutrition Diagnosis   Malnutrition Diagnosis  Patient has Malnutrition Diagnosis: No    Patient has Nutrition Diagnosis: Yes  Nutrition Diagnosis 1: Predicted inadequate energy intake  Diagnosis Status (1): New  Related to (1): cognitive impairment  As Evidenced by (1): reported intake <75% of meals, mild subcutaneous fat depletion     Nutrition Interventions/Recommendations   Nutrition Prescription  Individualized Nutrition Prescription Provided for : Continue diet as ordered by MD. Sasha Oh daily to encourage intake.  If intake falls below 50% of meals, consider appetite stimulant.    Food and/or Nutrient Delivery  Interventions  Meals and Snacks: General healthful diet  Goal: meet >75% estimated needs with intake.   Medical Food Supplement: Commercial beverage  Goal: Glucerna daily     Nutrition Monitoring and Evaluation   Food and Nutrient Related History   Amount of Food: Estimated amout of food  Criteria: intake >75% of meals    Anthropometrics: Body Composition/Growth/Weight History  Weight: Measured weight  Criteria: daily  Weight Change: Weight change percentage  Criteria: weekly    Biochemical Data, Medical Tests and Procedures  Electrolyte and Renal Panel: BUN, Calcium, serum, Chloride, Creatinine, Magnesium, Phosphorus, Potassium, Sodium  Criteria: as indicated  Glucose/Endocrine Profile: Glucose, casual, Hemoglobin A1c (HgbA1c)  Criteria: as indicated.    Nutrition Focused Physical Findings   Digestive System: Anorexia, Constipation, Diarrhea, Early satiety, Nausea, Vomiting  Criteria: daily    Follow Up  Last Date of Nutrition Visit: 02/21/24  Nutrition Follow-Up Needed?: Dietitian to reassess per policy  Follow up Comment: monitor intake-TR

## 2024-02-21 NOTE — NURSING NOTE
"Pt c/o \"not feeling right\". Pt noted to be holding her chest and tells me \"I can just feel it going up and down\". When asked to elaborate, pt rambles off topic and unable to describe how she is feeling. Pt is confused at baseline, A&Ox1. MD rounding at the time and was notified of this, see new orders.   "

## 2024-02-21 NOTE — PROGRESS NOTES
[facility] Hospitalist Progress Note        Arielle Alcaraz    :  1935(88 y.o.)    MRN:  [id number]  Date: 24     Assessment and Plan:     Melena  CAP  Chronic Atrial Fibrillation on eliquis  Hypothyroidism  Gout  T2DM  Thrombocytopenia  Anemia    Plan:  - GI consulted. FOBT negative. CTA negative for acute bleed. GI recommends against endoscopic evaluation. Trial metamucil.   - Will resume eliquis and monitor for recurrence of melena and trend Hgb.  - SLP evaluated, no s/s aspiration with puree and solids. May have esophageal dysphagia, possible reflux. Trial PPI. Will check barium esophogram given chest pain today and SLP concern for esophageal dysphagia. If abnormal, re discuss with GI  - Pulmonology following. Continue unasyn for PNA. Duo nebs and pulmonary hygiene.   - no baseline labs to compare thrombocytopenia. Possible due to PNA but will rule out alternative etiologies: HIV negative, Acute hepatitis panel pending, B12/folate wnl.   - Chest pain on . EKG without acute ST-T change. CXR no acute changes, nodular infiltrate noted which was seen on CT 2024. Repeat CT Chest in 4-6 weeks.      DVT Prophylaxis: full anticoagulation with eliquis    Disposition: continue to monitor inpatient, await consultant recommendations, await test results, and await clinical improvement    The patient had opportunity to ask questions. All questions were answered to the best of my ability. No family/HCPOA listed in chart.     Between 7AM-7PM please message me via Epic Secure Chat.  After 7PM please page Nocturnist on call.    Electronically signed by Xavier Reynolds DO on 24 at 4:53 PM     Subjective:      Interval History:   Vitals and chart notes from overnight reviewed.   No acute issues overnight.   Patient seen and evaluated at bedside.     Confused but able to state she doesn't feel right. Seen rubbing at her chest. No nausea, vomiting. No recurrence of melena or BRBPR per RN. Had green colored stool this  am per RN.    Review of Systems:   Unable: Dementia    Current medications:  Scheduled Meds:ampicillin-sulbactam, 3 g, intravenous, q6h  apixaban, 5 mg, oral, BID  cholecalciferol, 1,000 Units, oral, Daily  cyanocobalamin, 500 mcg, oral, Daily  divalproex sprinkle, 125 mg, oral, BID  insulin lispro, 0-10 Units, subcutaneous, TID with meals  ipratropium-albuteroL, 3 mL, nebulization, TID  melatonin, 3 mg, oral, Daily  metoprolol tartrate, 25 mg, oral, Daily  pantoprazole, 40 mg, oral, q12h   Or  pantoprazole, 40 mg, intravenous, q12h  QUEtiapine, 25 mg, oral, BID  sertraline, 25 mg, oral, Daily      Continuous Infusions:   PRN Meds:PRN medications: acetaminophen **OR** acetaminophen **OR** acetaminophen, dextrose 10 % in water (D10W), dextrose, glucagon, ipratropium-albuteroL, ondansetron **OR** ondansetron      Objective:     Heart Rate:  [53-70]   Temp:  [36.1 °C (97 °F)-36.6 °C (97.9 °F)]   Resp:  [17-18]   BP: (111-153)/(49-62)   Height:  [152.4 cm (5')]   SpO2:  [95 %-100 %]          Physical Exam  Vitals and nursing note reviewed.   HENT:      Mouth/Throat:      Mouth: Mucous membranes are moist.      Pharynx: Oropharynx is clear.   Cardiovascular:      Rate and Rhythm: Normal rate and regular rhythm.      Comments: TTP at xyphoid process   Pulmonary:      Effort: Pulmonary effort is normal.      Breath sounds: No wheezing or rales.   Abdominal:      General: There is no distension.      Palpations: Abdomen is soft.      Tenderness: There is no abdominal tenderness.   Musculoskeletal:      Right lower leg: No edema.      Left lower leg: No edema.   Neurological:      Mental Status: She is alert. Mental status is at baseline. She is disoriented.         Labs:   Lab Results   Component Value Date     02/20/2024    K 4.5 02/20/2024     (H) 02/20/2024    CO2 22 02/20/2024    BUN 25 (H) 02/20/2024    CREATININE 1.02 02/20/2024    GLUCOSE 75 02/20/2024    CALCIUM 8.7 02/20/2024    PROT 5.6 (L) 02/21/2024     BILITOT 0.3 02/21/2024    ALKPHOS 66 02/21/2024    AST 15 02/21/2024    ALT 7 02/21/2024       Lab Results   Component Value Date    WBC 5.7 02/21/2024    HGB 10.5 (L) 02/21/2024    HCT 36.0 02/21/2024    MCV 97 02/21/2024     (L) 02/21/2024

## 2024-02-21 NOTE — PROGRESS NOTES
[facility] Hospitalist Progress Note        Arielle Alcaraz    :  1935(88 y.o.)    MRN:  [id number]  Date: 24     Assessment and Plan:     Melena  CAP  Chronic Atrial Fibrillation on eliquis  Hypothyroidism  Gout  T2DM  Thrombocytopenia  Anemia    Plan:   - GI consulted. FOBT negative. CTA negative for acute bleed. GI recommends against endoscopic evaluation. Trial metamucil.   - Will resume eliquis and monitor for recurrence of melena and trend Hgb.  - SLP evaluated, no s/s aspiration with puree and solids. May have esophageal dysphagia, possible reflux. Trial PPI.   - Pulmonology following. Continue unasyn for PNA. Duo nebs and pulmonary hygiene.   - no baseline labs to compare thrombocytopenia. Possible due to PNA but will rule out HIV/Acute hepatitis/B12/folate def.       DVT Prophylaxis: full anticoagulation with eliquis    Disposition: continue to monitor inpatient, await consultant recommendations, await test results, and await clinical improvement    The patient/family had opportunity to ask questions. All questions were answered to the best of my ability.    Between 7AM-7PM please message me via Epic Secure Chat.  After 7PM please page Nocturnist on call.    Electronically signed by Xavier Reynolds DO on 24 at 8:22 PM     Subjective:      Interval History:   Vitals and chart notes from overnight reviewed.   No acute issues overnight.   Patient seen and evaluated at bedside.     No melena. Patient with dementia and provides limited history.     Review of Systems:   Unable: dementia    Current medications:  Scheduled Meds:ampicillin-sulbactam, 3 g, intravenous, q6h  apixaban, 5 mg, oral, BID  cholecalciferol, 1,000 Units, oral, Daily  cyanocobalamin, 500 mcg, oral, Daily  divalproex sprinkle, 125 mg, oral, BID  insulin lispro, 0-10 Units, subcutaneous, q4h  ipratropium-albuteroL, 3 mL, nebulization, TID  melatonin, 3 mg, oral, Daily  metoprolol tartrate, 25 mg, oral, Daily  pantoprazole, 40 mg, oral,  q12h   Or  pantoprazole, 40 mg, intravenous, q12h  QUEtiapine, 25 mg, oral, BID  sertraline, 25 mg, oral, Daily      Continuous Infusions:sodium chloride 0.9%, 100 mL/hr, Last Rate: 100 mL/hr (02/20/24 0100)      PRN Meds:PRN medications: acetaminophen **OR** acetaminophen **OR** acetaminophen, dextrose 10 % in water (D10W), dextrose, glucagon, ipratropium-albuteroL, ondansetron **OR** ondansetron      Objective:     Vitals:    02/20/24 1229 02/20/24 1330 02/20/24 1540 02/20/24 1915   BP: (!) 153/93 139/69 150/62 111/62   Pulse: 68 64 60 67   Resp: (!) 22 17 18 18   Temp:   36.3 °C (97.4 °F) 36.4 °C (97.5 °F)   TempSrc:   Temporal Temporal   SpO2: 99% 94% 98% 100%   Weight:            Physical Exam  Vitals and nursing note reviewed.   HENT:      Mouth/Throat:      Mouth: Mucous membranes are moist.      Pharynx: Oropharynx is clear.   Cardiovascular:      Rate and Rhythm: Normal rate. Rhythm irregular.   Pulmonary:      Effort: Pulmonary effort is normal.   Abdominal:      General: There is no distension.      Palpations: Abdomen is soft.      Tenderness: There is no abdominal tenderness.   Neurological:      Mental Status: She is alert. Mental status is at baseline. She is disoriented.         Labs:   Lab Results   Component Value Date     02/20/2024    K 4.5 02/20/2024     (H) 02/20/2024    CO2 22 02/20/2024    BUN 25 (H) 02/20/2024    CREATININE 1.02 02/20/2024    GLUCOSE 75 02/20/2024    CALCIUM 8.7 02/20/2024    PROT 6.9 02/19/2024    BILITOT 0.4 02/19/2024    ALKPHOS 77 02/19/2024    AST 21 02/19/2024    ALT 8 02/19/2024       Lab Results   Component Value Date    WBC 6.5 02/20/2024    HGB 11.1 (L) 02/20/2024    HCT 37.9 02/20/2024    MCV 95 02/20/2024     (L) 02/20/2024

## 2024-02-21 NOTE — PROGRESS NOTES
02/21/24 0945   Discharge Planning   Type of Residence Nursing home/residential care   Type of Post Acute Facility Services Long term care   Patient expects to be discharged [facility]: [facility] , no barriers to return   Does the patient need discharge transport arranged? Yes   RoundTrip coordination needed? Yes   Has discharge transport been arranged? No     Notified by physician patient is medically ready for discharge today, facility updated  Notified patient is not medically ready c/o chest pain-facility updated

## 2024-02-22 ENCOUNTER — PHARMACY VISIT (OUTPATIENT)
Dept: PHARMACY | Facility: CLINIC | Age: 89
End: 2024-02-22

## 2024-02-22 ENCOUNTER — APPOINTMENT (OUTPATIENT)
Dept: RADIOLOGY | Facility: HOSPITAL | Age: 89
DRG: 178 | End: 2024-02-22
Payer: COMMERCIAL

## 2024-02-22 VITALS
OXYGEN SATURATION: 99 % | WEIGHT: 110.01 LBS | HEART RATE: 67 BPM | DIASTOLIC BLOOD PRESSURE: 67 MMHG | RESPIRATION RATE: 18 BRPM | HEIGHT: 60 IN | BODY MASS INDEX: 21.6 KG/M2 | SYSTOLIC BLOOD PRESSURE: 145 MMHG | TEMPERATURE: 98.4 F

## 2024-02-22 LAB
ANION GAP SERPL CALC-SCNC: 11 MMOL/L (ref 10–20)
ATRIAL RATE: 107 BPM
BASOPHILS # BLD AUTO: 0.04 X10*3/UL (ref 0–0.1)
BASOPHILS NFR BLD AUTO: 0.6 %
BUN SERPL-MCNC: 9 MG/DL (ref 6–23)
CALCIUM SERPL-MCNC: 7.9 MG/DL (ref 8.6–10.3)
CARDIAC TROPONIN I PNL SERPL HS: 16 NG/L (ref 0–13)
CHLORIDE SERPL-SCNC: 108 MMOL/L (ref 98–107)
CO2 SERPL-SCNC: 25 MMOL/L (ref 21–32)
CREAT SERPL-MCNC: 0.87 MG/DL (ref 0.5–1.05)
EGFRCR SERPLBLD CKD-EPI 2021: 65 ML/MIN/1.73M*2
EOSINOPHIL # BLD AUTO: 0.2 X10*3/UL (ref 0–0.4)
EOSINOPHIL NFR BLD AUTO: 3.2 %
ERYTHROCYTE [DISTWIDTH] IN BLOOD BY AUTOMATED COUNT: 12.8 % (ref 11.5–14.5)
GLUCOSE BLD MANUAL STRIP-MCNC: 83 MG/DL (ref 74–99)
GLUCOSE BLD MANUAL STRIP-MCNC: 92 MG/DL (ref 74–99)
GLUCOSE SERPL-MCNC: 81 MG/DL (ref 74–99)
HCT VFR BLD AUTO: 33.6 % (ref 36–46)
HGB BLD-MCNC: 10.6 G/DL (ref 12–16)
IMM GRANULOCYTES # BLD AUTO: 0.03 X10*3/UL (ref 0–0.5)
IMM GRANULOCYTES NFR BLD AUTO: 0.5 % (ref 0–0.9)
LYMPHOCYTES # BLD AUTO: 2.22 X10*3/UL (ref 0.8–3)
LYMPHOCYTES NFR BLD AUTO: 35.4 %
MCH RBC QN AUTO: 27.9 PG (ref 26–34)
MCHC RBC AUTO-ENTMCNC: 31.5 G/DL (ref 32–36)
MCV RBC AUTO: 88 FL (ref 80–100)
MONOCYTES # BLD AUTO: 0.73 X10*3/UL (ref 0.05–0.8)
MONOCYTES NFR BLD AUTO: 11.6 %
NEUTROPHILS # BLD AUTO: 3.05 X10*3/UL (ref 1.6–5.5)
NEUTROPHILS NFR BLD AUTO: 48.7 %
NRBC BLD-RTO: 0 /100 WBCS (ref 0–0)
PLATELET # BLD AUTO: 122 X10*3/UL (ref 150–450)
POTASSIUM SERPL-SCNC: 3.4 MMOL/L (ref 3.5–5.3)
Q ONSET: 209 MS
QRS COUNT: 10 BEATS
QRS DURATION: 100 MS
QT INTERVAL: 472 MS
QTC CALCULATION(BAZETT): 486 MS
QTC FREDERICIA: 482 MS
R AXIS: -49 DEGREES
RBC # BLD AUTO: 3.8 X10*6/UL (ref 4–5.2)
SODIUM SERPL-SCNC: 141 MMOL/L (ref 136–145)
T AXIS: -11 DEGREES
T OFFSET: 445 MS
VENTRICULAR RATE: 64 BPM
WBC # BLD AUTO: 6.3 X10*3/UL (ref 4.4–11.3)

## 2024-02-22 PROCEDURE — RXMED WILLOW AMBULATORY MEDICATION CHARGE

## 2024-02-22 PROCEDURE — 85025 COMPLETE CBC W/AUTO DIFF WBC: CPT | Performed by: HOSPITALIST

## 2024-02-22 PROCEDURE — 94668 MNPJ CHEST WALL SBSQ: CPT

## 2024-02-22 PROCEDURE — 2500000001 HC RX 250 WO HCPCS SELF ADMINISTERED DRUGS (ALT 637 FOR MEDICARE OP): Performed by: HOSPITALIST

## 2024-02-22 PROCEDURE — 2500000004 HC RX 250 GENERAL PHARMACY W/ HCPCS (ALT 636 FOR OP/ED): Performed by: INTERNAL MEDICINE

## 2024-02-22 PROCEDURE — 2500000001 HC RX 250 WO HCPCS SELF ADMINISTERED DRUGS (ALT 637 FOR MEDICARE OP)

## 2024-02-22 PROCEDURE — 3430000001 HC RX 343 DIAGNOSTIC RADIOPHARMACEUTICALS: Performed by: HOSPITALIST

## 2024-02-22 PROCEDURE — A9698 NON-RAD CONTRAST MATERIALNOC: HCPCS | Performed by: HOSPITALIST

## 2024-02-22 PROCEDURE — 2500000004 HC RX 250 GENERAL PHARMACY W/ HCPCS (ALT 636 FOR OP/ED): Performed by: PHARMACIST

## 2024-02-22 PROCEDURE — 94640 AIRWAY INHALATION TREATMENT: CPT | Mod: MUE

## 2024-02-22 PROCEDURE — 2500000002 HC RX 250 W HCPCS SELF ADMINISTERED DRUGS (ALT 637 FOR MEDICARE OP, ALT 636 FOR OP/ED): Performed by: INTERNAL MEDICINE

## 2024-02-22 PROCEDURE — 74220 X-RAY XM ESOPHAGUS 1CNTRST: CPT | Performed by: RADIOLOGY

## 2024-02-22 PROCEDURE — 82947 ASSAY GLUCOSE BLOOD QUANT: CPT

## 2024-02-22 PROCEDURE — 84484 ASSAY OF TROPONIN QUANT: CPT | Performed by: HOSPITALIST

## 2024-02-22 PROCEDURE — 99239 HOSP IP/OBS DSCHRG MGMT >30: CPT | Performed by: HOSPITALIST

## 2024-02-22 PROCEDURE — 74220 X-RAY XM ESOPHAGUS 1CNTRST: CPT

## 2024-02-22 PROCEDURE — 2500000002 HC RX 250 W HCPCS SELF ADMINISTERED DRUGS (ALT 637 FOR MEDICARE OP, ALT 636 FOR OP/ED): Performed by: HOSPITALIST

## 2024-02-22 PROCEDURE — 82374 ASSAY BLOOD CARBON DIOXIDE: CPT | Performed by: HOSPITALIST

## 2024-02-22 RX ORDER — PANTOPRAZOLE SODIUM 40 MG/1
40 TABLET, DELAYED RELEASE ORAL EVERY 12 HOURS
Qty: 60 TABLET | Refills: 0 | Status: SHIPPED | OUTPATIENT
Start: 2024-02-22 | End: 2024-03-23

## 2024-02-22 RX ORDER — PSYLLIUM HUSK 3.4 G/5.8G
1 POWDER ORAL DAILY
Qty: 30 PACKET | Refills: 0 | Status: SHIPPED | OUTPATIENT
Start: 2024-02-22

## 2024-02-22 RX ORDER — AMOXICILLIN AND CLAVULANATE POTASSIUM 875; 125 MG/1; MG/1
1 TABLET, FILM COATED ORAL 2 TIMES DAILY
Qty: 6 TABLET | Refills: 0 | Status: SHIPPED | OUTPATIENT
Start: 2024-02-22 | End: 2024-02-25

## 2024-02-22 RX ADMIN — METOPROLOL TARTRATE 25 MG: 25 TABLET, FILM COATED ORAL at 09:39

## 2024-02-22 RX ADMIN — CYANOCOBALAMIN TAB 500 MCG 500 MCG: 500 TAB at 09:39

## 2024-02-22 RX ADMIN — DIVALPROEX SODIUM 125 MG: 125 CAPSULE, COATED PELLETS ORAL at 09:39

## 2024-02-22 RX ADMIN — ANTACID/ANTIFLATULENT 1 PACKET: 380; 550; 10; 10 GRANULE, EFFERVESCENT ORAL at 09:05

## 2024-02-22 RX ADMIN — SERTRALINE HYDROCHLORIDE 25 MG: 50 TABLET ORAL at 09:39

## 2024-02-22 RX ADMIN — AMPICILLIN SODIUM AND SULBACTAM SODIUM 3 G: 2; 1 INJECTION, POWDER, FOR SOLUTION INTRAMUSCULAR; INTRAVENOUS at 03:32

## 2024-02-22 RX ADMIN — APIXABAN 5 MG: 5 TABLET, FILM COATED ORAL at 09:39

## 2024-02-22 RX ADMIN — QUETIAPINE FUMARATE 25 MG: 25 TABLET ORAL at 09:39

## 2024-02-22 RX ADMIN — Medication 1000 UNITS: at 09:39

## 2024-02-22 RX ADMIN — BARIUM SULFATE 50 ML: 980 POWDER, FOR SUSPENSION ORAL at 09:04

## 2024-02-22 RX ADMIN — IPRATROPIUM BROMIDE AND ALBUTEROL SULFATE 3 ML: 2.5; .5 SOLUTION RESPIRATORY (INHALATION) at 13:57

## 2024-02-22 RX ADMIN — BARIUM SULFATE 183.33 ML: 960 POWDER, FOR SUSPENSION ORAL at 09:04

## 2024-02-22 RX ADMIN — PSYLLIUM HUSK 1 PACKET: 3.4 POWDER ORAL at 09:39

## 2024-02-22 RX ADMIN — AMPICILLIN SODIUM AND SULBACTAM SODIUM 3 G: 2; 1 INJECTION, POWDER, FOR SOLUTION INTRAMUSCULAR; INTRAVENOUS at 10:29

## 2024-02-22 RX ADMIN — PANTOPRAZOLE SODIUM 40 MG: 40 TABLET, DELAYED RELEASE ORAL at 12:38

## 2024-02-22 RX ADMIN — IPRATROPIUM BROMIDE AND ALBUTEROL SULFATE 3 ML: 2.5; .5 SOLUTION RESPIRATORY (INHALATION) at 07:58

## 2024-02-22 ASSESSMENT — PAIN SCALES - GENERAL
PAINLEVEL_OUTOF10: 0 - NO PAIN
PAINLEVEL_OUTOF10: 0 - NO PAIN

## 2024-02-22 ASSESSMENT — COGNITIVE AND FUNCTIONAL STATUS - GENERAL
MOVING TO AND FROM BED TO CHAIR: A LITTLE
CLIMB 3 TO 5 STEPS WITH RAILING: A LOT
DAILY ACTIVITIY SCORE: 18
WALKING IN HOSPITAL ROOM: A LITTLE
WALKING IN HOSPITAL ROOM: A LITTLE
MOVING TO AND FROM BED TO CHAIR: A LITTLE
EATING MEALS: A LITTLE
DRESSING REGULAR LOWER BODY CLOTHING: A LITTLE
DRESSING REGULAR LOWER BODY CLOTHING: A LITTLE
PERSONAL GROOMING: A LITTLE
STANDING UP FROM CHAIR USING ARMS: A LOT
TOILETING: A LITTLE
TOILETING: A LITTLE
PERSONAL GROOMING: A LITTLE
CLIMB 3 TO 5 STEPS WITH RAILING: A LOT
DRESSING REGULAR UPPER BODY CLOTHING: A LITTLE
DRESSING REGULAR UPPER BODY CLOTHING: A LITTLE
TURNING FROM BACK TO SIDE WHILE IN FLAT BAD: A LITTLE
STANDING UP FROM CHAIR USING ARMS: A LITTLE
EATING MEALS: A LITTLE
MOBILITY SCORE: 17
MOBILITY SCORE: 18
DAILY ACTIVITIY SCORE: 17
TURNING FROM BACK TO SIDE WHILE IN FLAT BAD: A LITTLE
HELP NEEDED FOR BATHING: A LOT
HELP NEEDED FOR BATHING: A LITTLE

## 2024-02-22 ASSESSMENT — PAIN - FUNCTIONAL ASSESSMENT: PAIN_FUNCTIONAL_ASSESSMENT: 0-10

## 2024-02-22 NOTE — DOCUMENTATION CLARIFICATION NOTE
"    PATIENT:               KAYLEIGH MARTINEZ  ACCT #:                  [id number]  MRN:                       [id number]  :                       1935  ADMIT DATE:       2024 1:00 PM  DISCH DATE:  RESPONDING PROVIDER #:        [id number]          PROVIDER RESPONSE TEXT:    Aspiration PNA    CDI QUERY TEXT:    [facility]_Pneumonia Specificity    Question: Please clarify the type of pneumonia being treated as:    When answering this query, please exercise your independent professional judgment. The fact that a question is being asked, does not imply that any particular answer is desired or expected.    The patient's clinical indicators include:  Clinical Information: 89 y/o female admitted since , being treated for pneumonia. Clarification is requested for type of pneumonia being treated.    Clinical Indicators: cough, flu/covid negative,  Esophogram- \"esophageal dysmotility noted\"     Pulmonary note, Amina Mayfield APRN: \"Pneumonia; community-acquired, likely aspiration with CT A/P showing bilateral GGO's with lower lobe bronchi debris with diffuse wall thickening; moist cough with rhonchi and squeeks\"     Medicine note, Dr. Reynolds:  \"CAP\", \"SLP evaluated, no s/s aspiration with puree and solids. May have esophageal dysphagia, possible reflux. Trial PPI. Will check barium esophogram given chest pain today and SLP concern for esophageal dysphagia\"    Treatment: IV Unasyn 3g -     Risk Factors: dementia  Options provided:  -- Aspiration PNA  -- Gram Negative PNA  -- Bacterial PNA  -- Other - I will add my own diagnosis  -- Refer to Clinical Documentation Reviewer    Query created by: Carmela Horner on 2024 2:53 PM      Electronically signed by:  PEEWEE REYNOLDS DO 2024 3:16 PM          "

## 2024-02-22 NOTE — CARE PLAN
Problem: Skin  Goal: Prevent/minimize sheer/friction injuries  Outcome: Progressing  Flowsheets (Taken 2/22/2024 6135)  Prevent/minimize sheer/friction injuries: Increase activity/out of bed for meals  Goal: Promote/optimize nutrition  Outcome: Progressing

## 2024-02-22 NOTE — PROGRESS NOTES
02/22/24 1659   Discharge Planning   [facility]   Does the patient need discharge transport arranged? Yes   RoundTrip coordination needed? Yes   Has discharge transport been arranged? Yes   What day is the transport expected?   (4;pm)

## 2024-02-22 NOTE — PROGRESS NOTES
2/22/2024 2:31 PM Message left for guardian that patient will be discharged at 4:00 PM today. Jenelle SAVAGE

## 2024-02-22 NOTE — CARE PLAN
Problem: Nutrition  Goal: Less than 5 days NPO/clear liquids  Outcome: Adequate for Discharge  Goal: Oral intake greater than 50%  Outcome: Adequate for Discharge  Goal: Oral intake greater 75%  Outcome: Adequate for Discharge  Goal: Consume prescribed supplement  Outcome: Adequate for Discharge  Goal: Adequate PO fluid intake  Outcome: Adequate for Discharge  Goal: Nutrition support goals are met within 48 hrs  Outcome: Adequate for Discharge  Goal: Nutrition support is meeting 75% of nutrient needs  Outcome: Adequate for Discharge  Goal: Tube feed tolerance  Outcome: Adequate for Discharge  Goal: BG  mg/dL  Outcome: Adequate for Discharge  Goal: Lab values WNL  Outcome: Adequate for Discharge  Goal: Electrolytes WNL  Outcome: Adequate for Discharge  Goal: Promote healing  Outcome: Adequate for Discharge  Goal: Maintain stable weight  Outcome: Adequate for Discharge  Goal: Reduce weight from edema/fluid  Outcome: Adequate for Discharge  Goal: Gradual weight gain  Outcome: Adequate for Discharge  Goal: Improve ostomy output  Outcome: Adequate for Discharge     Problem: Pain - Adult  Goal: Verbalizes/displays adequate comfort level or baseline comfort level  Outcome: Adequate for Discharge     Problem: Safety - Adult  Goal: Free from fall injury  Outcome: Adequate for Discharge     Problem: Discharge Planning  Goal: Discharge to home or other facility with appropriate resources  Outcome: Adequate for Discharge     Problem: Chronic Conditions and Co-morbidities  Goal: Patient's chronic conditions and co-morbidity symptoms are monitored and maintained or improved  Outcome: Adequate for Discharge     Problem: Fall/Injury  Goal: Not fall by end of shift  Outcome: Adequate for Discharge  Goal: Be free from injury by end of the shift  Outcome: Adequate for Discharge  Goal: Verbalize understanding of personal risk factors for fall in the hospital  Outcome: Adequate for Discharge  Goal: Verbalize understanding of  risk factor reduction measures to prevent injury from fall in the home  Outcome: Adequate for Discharge  Goal: Use assistive devices by end of the shift  Outcome: Adequate for Discharge  Goal: Pace activities to prevent fatigue by end of the shift  Outcome: Adequate for Discharge     Problem: Skin  Goal: Participates in plan/prevention/treatment measures  Outcome: Adequate for Discharge  Goal: Prevent/manage excess moisture  Outcome: Adequate for Discharge  Goal: Prevent/minimize sheer/friction injuries  2/22/2024 1456 by Liza Narvaez RN  Outcome: Adequate for Discharge  2/22/2024 1456 by Liza Narvaez RN  Outcome: Progressing  Flowsheets (Taken 2/22/2024 1456)  Prevent/minimize sheer/friction injuries: Increase activity/out of bed for meals  Goal: Promote/optimize nutrition  2/22/2024 1456 by Liza Narvaez RN  Outcome: Adequate for Discharge  2/22/2024 1456 by Liza Narvaez RN  Outcome: Progressing  Goal: Promote skin healing  Outcome: Adequate for Discharge

## 2024-02-23 NOTE — DISCHARGE SUMMARY
Discharge Diagnosis  Melena  CAP  Chronic Atrial Fibrillation on eliquis  Hypothyroidism  Gout  T2DM  Thrombocytopenia  Anemia    Issues Requiring Follow-Up  - repeat CBC in 1 week  - op follow up with GI    Discharge Meds     Your medication list        START taking these medications        Instructions Last Dose Given Next Dose Due   amoxicillin-pot clavulanate 875-125 mg tablet  Commonly known as: Augmentin      Take 1 tablet by mouth 2 times a day for 3 days.       Metamucil Fiber Singles 3.4 gram packet  Generic drug: psyllium      Take 1 packet by mouth once daily. Mix with water as directed before drinking       pantoprazole 40 mg EC tablet  Commonly known as: ProtoNix      Take 1 tablet (40 mg) by mouth every 12 hours. Do not crush, chew, or split.              CONTINUE taking these medications        Instructions Last Dose Given Next Dose Due   acetaminophen 500 mg tablet  Commonly known as: Tylenol           apixaban 5 mg tablet  Commonly known as: Eliquis           cholecalciferol 25 MCG (1000 UT) tablet  Commonly known as: Vitamin D-3           cyanocobalamin 500 mcg tablet  Commonly known as: Vitamin B-12           divalproex sprinkle 125 mg DR capsule  Commonly known as: Depakote Sprinkle           lisinopril 10 mg tablet           metoprolol tartrate 25 mg tablet  Commonly known as: Lopressor           multivitamin-children's chewable tablet  Commonly known as: Cerovite, Jr           QUEtiapine 25 mg tablet  Commonly known as: SEROquel           sertraline 25 mg tablet  Commonly known as: Zoloft                  STOP taking these medications      esomeprazole 20 mg DR capsule  Commonly known as: NexIUM                  Where to Get Your Medications        These medications were sent to [facility]  [address], [address], [place] [address]      Hours: 8 AM to 6 PM Mon-Fri, 9 AM to 1 PM Saturday Phone: [phone]   amoxicillin-pot clavulanate 875-125 mg tablet  Metamucil Fiber  Singles 3.4 gram packet  pantoprazole 40 mg EC tablet         Test Results Pending At Discharge  Pending Labs       No current pending labs.            Hospital Course  88 y.o. female with a past medical history of hypertension, diabetes mellitus type 2, gout, iron deficiency anemia, hypothyroidism, chronic atrial fibrillation on apixaban presenting with GI bleeding and aspiration pneumonia from a nursing home.  Per skilled nursing home staff began having melanotic stool red tarry stools last evening.  She is on apixaban for chronic atrial fibrillation.  At baseline the patient is awake and alert and oriented x 1.     GI consulted. FOBT negative. CTA negative for acute bleed. GI recommends against endoscopic evaluation. Trial metamucil. Eliquis resumed and no melena or BRBPR here in hospital. Hgb stable. Continue PPI. SLP evaluated, no s/s aspiration with puree and solids. May have esophageal dysphagia, possible reflux. Esophogram incomplete but signs of esophageal dysmotility. OP follow up with GI although options likely limited due to dementia. Treated pna with IV unasyn, plan to dc with PO augmentin.     Chest pain on 2/21. EKG without acute ST-T change. CXR no acute changes, nodular infiltrate noted which was seen on CT 02/19/2024. Repeat CT Chest in 4-6 weeks.     Had thrombocytopenia this admit, no baseline labs for comparison. HIV negative, Acute hepatitis panel negative, B12/folate wnl.  Repeat CBC in 1 week.    Pertinent Physical Exam At Time of Discharge  Physical Exam  Vitals and nursing note reviewed.   HENT:      Mouth/Throat:      Mouth: Mucous membranes are moist.      Pharynx: Oropharynx is clear.   Cardiovascular:      Rate and Rhythm: Normal rate and regular rhythm.   Pulmonary:      Effort: Pulmonary effort is normal.   Abdominal:      General: There is no distension.      Palpations: Abdomen is soft.      Tenderness: There is no abdominal tenderness.   Musculoskeletal:      Right lower leg: No  edema.      Left lower leg: No edema.   Neurological:      General: No focal deficit present.      Mental Status: She is alert. Mental status is at baseline. She is disoriented.         Outpatient Follow-Up  No future appointments.      Xavier Reynolds, DO

## 2024-02-26 LAB
Q ONSET: 208 MS
QRS COUNT: 10 BEATS
QRS DURATION: 106 MS
QT INTERVAL: 430 MS
QTC CALCULATION(BAZETT): 432 MS
QTC FREDERICIA: 432 MS
R AXIS: -54 DEGREES
T AXIS: 1 DEGREES
T OFFSET: 423 MS
VENTRICULAR RATE: 61 BPM

## 2024-03-01 NOTE — DOCUMENTATION CLARIFICATION NOTE
"    PATIENT:               KAYLEIGH MARTINEZ  ACCT #:                  [id number]  MRN:                       [id number]  :                       1935  ADMIT DATE:       2024 1:00 PM  DISCH DATE:        2024 5:19 PM  RESPONDING PROVIDER #:        [id number]          PROVIDER RESPONSE TEXT:    GIB/melena ruled out after workup    CDI QUERY TEXT:    [facility]_Diagnosis Ruled Out In    Question: Please further clarify the documentation of GIB/melena as:    When answering this query, please exercise your independent professional judgment. The fact that a question is being asked, does not imply that any particular answer is desired or expected.    The patient's clinical indicators include:  Clinical Information: 87 y/o female admitted -  for reports of melena and treated for aspiration pneumonia. Clarification is requested for documentation of melena and GIB.    Clinical Indicators: CT abdomen negative for bleeding, Occult stool negative, hemoglobin trend 11.8/11.1/10.5/10.6     ED note, Dr. Leon: \"presenting to the emergency department for GI bleed. Per her nursing home, had a melanotic stool last night and had an episode of dark/bright red tarry stools in her bed that continued into this morning. Has never had lower GI bleed before\", \"Digital rectal exam showed light brown stool, no melena or evidence of bleeding\"     GI consult, Kath Cameron, CNP: \"No signs of acute GIB.  Occult blood negative, hgb at baseline, brown stool. Consider hemorrhoids or vaginal bleeding. -recommend metamucil.  GI signing off\"     DC Summary, Dr. Reynolds:  \"melena\" is listed as a discharge diagnosis    Treatment: GI consult, oral/IV Protonix 40mg QD    Risk Factors: anticoagulation, history of anemia  Options provided:  -- GIB/melena ruled out after workup  -- GIB/melena ruled in for this admission  -- Other - I will add my own diagnosis  -- Refer to Clinical Documentation Reviewer    Query created by: Siri, " Carmela on 2/29/2024 11:02 AM      Electronically signed by:  PEEWEE SALINAS DO 3/1/2024 10:00 AM

## 2024-03-12 NOTE — PROGRESS NOTES
Arielle Alcaraz is a 88 y.o. female with past medical history of HTN, atrial fibrillation on Eliquis, hypothyroidism, dementia, and gout who is referred by Dr. Xavier Reynolds for gastrointestinal hemorrhage. She presented to ER 2/19/2024 due to GI bleeding and aspiration pneumonia from a nursing home. At baseline the patient is awake and alert and oriented to self only. According to skilled nursing home staff she begain having melenic and red tarry stools the evening before. CTA negative for acute bleed. GI consulted and recommended against endoscopic evaluation. Trial Metamucil and PPI. Eliquis resumed and no further melena or BRBPR while in hospital. Hgb stable. SLP evaluated, no aspiration with puree and solids. May have esophageal dysphagia, possible reflux. Esophogram incomplete but signs of esophageal dysmotility. OP follow up with GI although options likely limited due to dementia. Pneumonia treated with antibiotics.    Presents today for hospital follow up. She tells me she overall feels well. Repeat Hgb about a month ago stable at 10. She had labs redrawn yesterday which was brought with her today. Hgb was the same, 10.1, MCV 87. According to staff who accompany her today as well as RN on phone, she has been doing well. Since she left the hospital she has had 1 episode of red rectal bleeding about 3 weeks ago with her stool. Continues No further bleeding. Continues on on Metamucil and Protonix 40 mg daily. No constipation or pain. She does not take NSAIDS.    Family history: Family history unknown.     No past medical history on file.  No past surgical history on file.  Current Outpatient Medications   Medication Sig Dispense Refill    acetaminophen (Tylenol) 500 mg tablet Take 2 tablets (1,000 mg) by mouth 2 times a day as needed for mild pain (1 - 3).      apixaban (Eliquis) 5 mg tablet Take 1 tablet (5 mg) by mouth 2 times a day.      cholecalciferol (Vitamin D-3) 25 MCG (1000 UT) tablet Take 1 tablet (1,000  Units) by mouth once daily.      cyanocobalamin (Vitamin B-12) 500 mcg tablet Take 1 tablet (500 mcg) by mouth once daily.      divalproex sprinkle (Depakote Sprinkle) 125 mg DR capsule Take 1 capsule (125 mg) by mouth 2 times a day.      lisinopril 10 mg tablet Take 1 tablet (10 mg) by mouth once daily.      metoprolol tartrate (Lopressor) 25 mg tablet Take 1 tablet (25 mg) by mouth once daily.      multivitamin-children's (Cerovite, Jr) chewable tablet Chew 1 tablet once daily.      pantoprazole (ProtoNix) 40 mg EC tablet Take 1 tablet (40 mg) by mouth every 12 hours. Do not crush, chew, or split. 60 tablet 0    psyllium (Metamucil Fiber Singles) 3.4 gram packet Take 1 packet by mouth once daily. Mix with water as directed before drinking 30 packet 0    QUEtiapine (SEROquel) 25 mg tablet Take 1 tablet (25 mg) by mouth 2 times a day.      sertraline (Zoloft) 100 mg tablet Take 25 mg by mouth once daily.       No current facility-administered medications for this visit.     No Known Allergies    No family history on file.    Review of Systems  Review of Systems negative except as noted in HPI.    Objective     /58 (BP Location: Right arm, Patient Position: Sitting)   Pulse 50   Temp 36.7 °C (98 °F) (Temporal)   Wt 56.2 kg (124 lb)   SpO2 97% Comment: RA  BMI 24.22 kg/m²      Physical Exam  Constitutional:  No acute distress. Elderly female. Chronically ill-appearing.  HENT:  Head normocephalic and atraumatic. Conjunctivae normal.  Cardiovascular:  Normal rate. Regular rhythm.  Pulmonary:  Pulmonary effort normal. No respiratory distress. Breath sounds clear.  Abdominal:  Abdomen is soft. There is no distension. No tenderness or guarding.  Skin: Dry.  Neurological:  Alert. Oriented to self.  Psychiatric:  Mood and affect normal.    Assessment/Plan     88 y.o. female with history of HTN, atrial fibrillation on Eliquis, hypothyroidism, dementia, and gout who presents today for initial clinic visit for  hospital follow up after recent ER visit last month for dark melenic stool. CTA without acute bleed and Hgb has been stable. She has overall been doing well on Metamucil and PPI which we will continue. If bleeding recurs options limited due to her advanced age and medical conditions.    Recommendations  Repeat CBC every 3 months x 2. Hopefully this will continue to be stable at 10.   Continue Metamucil and Protonix 40 mg daily on empty stomach.  Follow up as needed.     Electronically signed by: Kaleigh Pulido CNP on 3/19/2024 at 10:23 AM

## 2024-03-19 ENCOUNTER — OFFICE VISIT (OUTPATIENT)
Dept: GASTROENTEROLOGY | Facility: HOSPITAL | Age: 89
End: 2024-03-19
Payer: COMMERCIAL

## 2024-03-19 VITALS
WEIGHT: 124 LBS | DIASTOLIC BLOOD PRESSURE: 58 MMHG | TEMPERATURE: 98 F | OXYGEN SATURATION: 97 % | SYSTOLIC BLOOD PRESSURE: 136 MMHG | HEART RATE: 50 BPM | BODY MASS INDEX: 24.22 KG/M2

## 2024-03-19 DIAGNOSIS — K92.2 GASTROINTESTINAL HEMORRHAGE, UNSPECIFIED GASTROINTESTINAL HEMORRHAGE TYPE: Primary | ICD-10-CM

## 2024-03-19 PROCEDURE — 1036F TOBACCO NON-USER: CPT | Performed by: NURSE PRACTITIONER

## 2024-03-19 PROCEDURE — 1111F DSCHRG MED/CURRENT MED MERGE: CPT | Performed by: NURSE PRACTITIONER

## 2024-03-19 PROCEDURE — 1159F MED LIST DOCD IN RCRD: CPT | Performed by: NURSE PRACTITIONER

## 2024-03-19 PROCEDURE — 99214 OFFICE O/P EST MOD 30 MIN: CPT | Performed by: NURSE PRACTITIONER

## 2024-03-19 PROCEDURE — 99204 OFFICE O/P NEW MOD 45 MIN: CPT | Performed by: NURSE PRACTITIONER

## 2024-03-19 RX ORDER — SERTRALINE HYDROCHLORIDE 100 MG/1
25 TABLET, FILM COATED ORAL DAILY
COMMUNITY
Start: 2024-01-14

## 2024-03-19 SDOH — ECONOMIC STABILITY: FOOD INSECURITY: WITHIN THE PAST 12 MONTHS, YOU WORRIED THAT YOUR FOOD WOULD RUN OUT BEFORE YOU GOT MONEY TO BUY MORE.: NEVER TRUE

## 2024-03-19 SDOH — ECONOMIC STABILITY: FOOD INSECURITY: WITHIN THE PAST 12 MONTHS, THE FOOD YOU BOUGHT JUST DIDN'T LAST AND YOU DIDN'T HAVE MONEY TO GET MORE.: NEVER TRUE

## 2024-03-19 ASSESSMENT — LIFESTYLE VARIABLES
HOW OFTEN DO YOU HAVE SIX OR MORE DRINKS ON ONE OCCASION: NEVER
AUDIT-C TOTAL SCORE: 0
HOW OFTEN DO YOU HAVE A DRINK CONTAINING ALCOHOL: NEVER
SKIP TO QUESTIONS 9-10: 1
HOW MANY STANDARD DRINKS CONTAINING ALCOHOL DO YOU HAVE ON A TYPICAL DAY: PATIENT DOES NOT DRINK

## 2024-03-19 ASSESSMENT — PATIENT HEALTH QUESTIONNAIRE - PHQ9
SUM OF ALL RESPONSES TO PHQ9 QUESTIONS 1 & 2: 0
1. LITTLE INTEREST OR PLEASURE IN DOING THINGS: NOT AT ALL
2. FEELING DOWN, DEPRESSED OR HOPELESS: NOT AT ALL

## 2024-03-19 NOTE — PATIENT INSTRUCTIONS
Hopefully she continues to do well without further episodes of bleeding. It is reassuring that her blood levels have been stable at 10 since leaving the hospital. If bleeding recurs and is a significant amount she will need to return to ER. Although options likely limited due to her advanced age and medical conditions.    Recommendations  Repeat CBC every 3 months x 2. Hopefully this will continue to be stable at 10.   Continue Metamucil and Protonix 40 mg daily on empty stomach.  Follow up as needed.

## 2025-01-06 ENCOUNTER — APPOINTMENT (OUTPATIENT)
Dept: RADIOLOGY | Facility: HOSPITAL | Age: 89
End: 2025-01-06
Payer: COMMERCIAL

## 2025-01-06 ENCOUNTER — HOSPITAL ENCOUNTER (INPATIENT)
Facility: HOSPITAL | Age: 89
LOS: 1 days | Discharge: NURSING, REHAB OR LONG-TERM CARE | End: 2025-01-08
Attending: EMERGENCY MEDICINE | Admitting: INTERNAL MEDICINE
Payer: COMMERCIAL

## 2025-01-06 ENCOUNTER — APPOINTMENT (OUTPATIENT)
Dept: CARDIOLOGY | Facility: HOSPITAL | Age: 89
End: 2025-01-06
Payer: COMMERCIAL

## 2025-01-06 DIAGNOSIS — I67.850 CEREBRAL AUTOSOMAL DOMINANT ARTERIOPATHY WITH SUBCORTICAL INFARCTS AND LEUKOENCEPHALOPATHY: ICD-10-CM

## 2025-01-06 DIAGNOSIS — A41.9 SEPSIS, DUE TO UNSPECIFIED ORGANISM, UNSPECIFIED WHETHER ACUTE ORGAN DYSFUNCTION PRESENT (MULTI): Primary | ICD-10-CM

## 2025-01-06 DIAGNOSIS — J18.9 PNEUMONIA DUE TO INFECTIOUS ORGANISM, UNSPECIFIED LATERALITY, UNSPECIFIED PART OF LUNG: ICD-10-CM

## 2025-01-06 DIAGNOSIS — R55 VASOVAGAL SYNCOPE: ICD-10-CM

## 2025-01-06 DIAGNOSIS — K52.9 COLITIS: ICD-10-CM

## 2025-01-06 LAB
ALBUMIN SERPL BCP-MCNC: 3.7 G/DL (ref 3.4–5)
ALP SERPL-CCNC: 88 U/L (ref 33–136)
ALT SERPL W P-5'-P-CCNC: 10 U/L (ref 7–45)
ANION GAP BLDV CALCULATED.4IONS-SCNC: 13 MMOL/L (ref 10–25)
ANION GAP BLDV CALCULATED.4IONS-SCNC: 8 MMOL/L (ref 10–25)
ANION GAP SERPL CALC-SCNC: 14 MMOL/L (ref 10–20)
AST SERPL W P-5'-P-CCNC: 21 U/L (ref 9–39)
ATRIAL RATE: 66 BPM
BASE EXCESS BLDV CALC-SCNC: -1.8 MMOL/L (ref -2–3)
BASE EXCESS BLDV CALC-SCNC: -5 MMOL/L (ref -2–3)
BASOPHILS # BLD AUTO: 0.02 X10*3/UL (ref 0–0.1)
BASOPHILS NFR BLD AUTO: 0.2 %
BILIRUB SERPL-MCNC: 0.5 MG/DL (ref 0–1.2)
BNP SERPL-MCNC: 77 PG/ML (ref 0–99)
BODY TEMPERATURE: 37 DEGREES CELSIUS
BODY TEMPERATURE: 37 DEGREES CELSIUS
BUN SERPL-MCNC: 42 MG/DL (ref 6–23)
CA-I BLDV-SCNC: 1.2 MMOL/L (ref 1.1–1.33)
CA-I BLDV-SCNC: 1.24 MMOL/L (ref 1.1–1.33)
CALCIUM SERPL-MCNC: 9.1 MG/DL (ref 8.6–10.3)
CARDIAC TROPONIN I PNL SERPL HS: 18 NG/L (ref 0–13)
CARDIAC TROPONIN I PNL SERPL HS: 26 NG/L (ref 0–13)
CHLORIDE BLDV-SCNC: 108 MMOL/L (ref 98–107)
CHLORIDE BLDV-SCNC: 110 MMOL/L (ref 98–107)
CHLORIDE SERPL-SCNC: 109 MMOL/L (ref 98–107)
CO2 SERPL-SCNC: 23 MMOL/L (ref 21–32)
CREAT SERPL-MCNC: 1.64 MG/DL (ref 0.5–1.05)
EGFRCR SERPLBLD CKD-EPI 2021: 30 ML/MIN/1.73M*2
EOSINOPHIL # BLD AUTO: 0.01 X10*3/UL (ref 0–0.4)
EOSINOPHIL NFR BLD AUTO: 0.1 %
ERYTHROCYTE [DISTWIDTH] IN BLOOD BY AUTOMATED COUNT: 12.2 % (ref 11.5–14.5)
FLUAV RNA RESP QL NAA+PROBE: NOT DETECTED
FLUBV RNA RESP QL NAA+PROBE: NOT DETECTED
GLUCOSE BLDV-MCNC: 131 MG/DL (ref 74–99)
GLUCOSE BLDV-MCNC: 198 MG/DL (ref 74–99)
GLUCOSE SERPL-MCNC: 134 MG/DL (ref 74–99)
HCO3 BLDV-SCNC: 23 MMOL/L (ref 22–26)
HCO3 BLDV-SCNC: 24.3 MMOL/L (ref 22–26)
HCT VFR BLD AUTO: 33.8 % (ref 36–46)
HCT VFR BLD EST: 31 % (ref 36–46)
HCT VFR BLD EST: 35 % (ref 36–46)
HGB BLD-MCNC: 10.9 G/DL (ref 12–16)
HGB BLDV-MCNC: 10.2 G/DL (ref 12–16)
HGB BLDV-MCNC: 11.6 G/DL (ref 12–16)
IMM GRANULOCYTES # BLD AUTO: 0.04 X10*3/UL (ref 0–0.5)
IMM GRANULOCYTES NFR BLD AUTO: 0.3 % (ref 0–0.9)
INHALED O2 CONCENTRATION: 21 %
INHALED O2 CONCENTRATION: 21 %
LACTATE BLDV-SCNC: 1.6 MMOL/L (ref 0.4–2)
LACTATE BLDV-SCNC: 4 MMOL/L (ref 0.4–2)
LACTATE SERPL-SCNC: 1.4 MMOL/L (ref 0.4–2)
LYMPHOCYTES # BLD AUTO: 0.37 X10*3/UL (ref 0.8–3)
LYMPHOCYTES NFR BLD AUTO: 3 %
MAGNESIUM SERPL-MCNC: 1.59 MG/DL (ref 1.6–2.4)
MCH RBC QN AUTO: 29.1 PG (ref 26–34)
MCHC RBC AUTO-ENTMCNC: 32.2 G/DL (ref 32–36)
MCV RBC AUTO: 90 FL (ref 80–100)
MONOCYTES # BLD AUTO: 0.8 X10*3/UL (ref 0.05–0.8)
MONOCYTES NFR BLD AUTO: 6.6 %
NEUTROPHILS # BLD AUTO: 10.95 X10*3/UL (ref 1.6–5.5)
NEUTROPHILS NFR BLD AUTO: 89.8 %
NRBC BLD-RTO: 0 /100 WBCS (ref 0–0)
OXYHGB MFR BLDV: 41.2 % (ref 45–75)
OXYHGB MFR BLDV: 61.3 % (ref 45–75)
P AXIS: 44 DEGREES
P OFFSET: 150 MS
P ONSET: 117 MS
PCO2 BLDV: 46 MM HG (ref 41–51)
PCO2 BLDV: 55 MM HG (ref 41–51)
PH BLDV: 7.23 PH (ref 7.33–7.43)
PH BLDV: 7.33 PH (ref 7.33–7.43)
PLATELET # BLD AUTO: 130 X10*3/UL (ref 150–450)
PO2 BLDV: 32 MM HG (ref 35–45)
PO2 BLDV: 38 MM HG (ref 35–45)
POTASSIUM BLDV-SCNC: 4 MMOL/L (ref 3.5–5.3)
POTASSIUM BLDV-SCNC: 4.3 MMOL/L (ref 3.5–5.3)
POTASSIUM SERPL-SCNC: 4.3 MMOL/L (ref 3.5–5.3)
PR INTERVAL: 188 MS
PROT SERPL-MCNC: 6.6 G/DL (ref 6.4–8.2)
Q ONSET: 211 MS
QRS COUNT: 10 BEATS
QRS DURATION: 98 MS
QT INTERVAL: 450 MS
QTC CALCULATION(BAZETT): 464 MS
QTC FREDERICIA: 459 MS
R AXIS: -55 DEGREES
RBC # BLD AUTO: 3.75 X10*6/UL (ref 4–5.2)
SAO2 % BLDV: 42 % (ref 45–75)
SAO2 % BLDV: 62 % (ref 45–75)
SARS-COV-2 RNA RESP QL NAA+PROBE: NOT DETECTED
SODIUM BLDV-SCNC: 138 MMOL/L (ref 136–145)
SODIUM BLDV-SCNC: 140 MMOL/L (ref 136–145)
SODIUM SERPL-SCNC: 142 MMOL/L (ref 136–145)
T AXIS: 56 DEGREES
T OFFSET: 436 MS
T4 FREE SERPL-MCNC: 0.89 NG/DL (ref 0.61–1.12)
TSH SERPL-ACNC: 6.23 MIU/L (ref 0.44–3.98)
VENTRICULAR RATE: 64 BPM
WBC # BLD AUTO: 12.2 X10*3/UL (ref 4.4–11.3)

## 2025-01-06 PROCEDURE — 2500000004 HC RX 250 GENERAL PHARMACY W/ HCPCS (ALT 636 FOR OP/ED): Performed by: EMERGENCY MEDICINE

## 2025-01-06 PROCEDURE — 72170 X-RAY EXAM OF PELVIS: CPT

## 2025-01-06 PROCEDURE — 71045 X-RAY EXAM CHEST 1 VIEW: CPT

## 2025-01-06 PROCEDURE — 74176 CT ABD & PELVIS W/O CONTRAST: CPT | Performed by: RADIOLOGY

## 2025-01-06 PROCEDURE — 83605 ASSAY OF LACTIC ACID: CPT | Performed by: EMERGENCY MEDICINE

## 2025-01-06 PROCEDURE — 73564 X-RAY EXAM KNEE 4 OR MORE: CPT | Performed by: RADIOLOGY

## 2025-01-06 PROCEDURE — 84132 ASSAY OF SERUM POTASSIUM: CPT

## 2025-01-06 PROCEDURE — 87636 SARSCOV2 & INF A&B AMP PRB: CPT | Performed by: EMERGENCY MEDICINE

## 2025-01-06 PROCEDURE — 99285 EMERGENCY DEPT VISIT HI MDM: CPT | Mod: 25 | Performed by: EMERGENCY MEDICINE

## 2025-01-06 PROCEDURE — 2500000004 HC RX 250 GENERAL PHARMACY W/ HCPCS (ALT 636 FOR OP/ED)

## 2025-01-06 PROCEDURE — 73552 X-RAY EXAM OF FEMUR 2/>: CPT | Mod: RT

## 2025-01-06 PROCEDURE — 84439 ASSAY OF FREE THYROXINE: CPT

## 2025-01-06 PROCEDURE — 82435 ASSAY OF BLOOD CHLORIDE: CPT

## 2025-01-06 PROCEDURE — 84484 ASSAY OF TROPONIN QUANT: CPT

## 2025-01-06 PROCEDURE — 70450 CT HEAD/BRAIN W/O DYE: CPT

## 2025-01-06 PROCEDURE — 87040 BLOOD CULTURE FOR BACTERIA: CPT | Mod: AHULAB | Performed by: EMERGENCY MEDICINE

## 2025-01-06 PROCEDURE — 71045 X-RAY EXAM CHEST 1 VIEW: CPT | Performed by: RADIOLOGY

## 2025-01-06 PROCEDURE — 72170 X-RAY EXAM OF PELVIS: CPT | Performed by: RADIOLOGY

## 2025-01-06 PROCEDURE — 83880 ASSAY OF NATRIURETIC PEPTIDE: CPT

## 2025-01-06 PROCEDURE — 73552 X-RAY EXAM OF FEMUR 2/>: CPT | Performed by: RADIOLOGY

## 2025-01-06 PROCEDURE — 72125 CT NECK SPINE W/O DYE: CPT | Performed by: RADIOLOGY

## 2025-01-06 PROCEDURE — 72125 CT NECK SPINE W/O DYE: CPT

## 2025-01-06 PROCEDURE — 83735 ASSAY OF MAGNESIUM: CPT

## 2025-01-06 PROCEDURE — 73564 X-RAY EXAM KNEE 4 OR MORE: CPT | Mod: RT

## 2025-01-06 PROCEDURE — 70450 CT HEAD/BRAIN W/O DYE: CPT | Performed by: RADIOLOGY

## 2025-01-06 PROCEDURE — 71250 CT THORAX DX C-: CPT | Performed by: RADIOLOGY

## 2025-01-06 PROCEDURE — 96366 THER/PROPH/DIAG IV INF ADDON: CPT

## 2025-01-06 PROCEDURE — 85025 COMPLETE CBC W/AUTO DIFF WBC: CPT | Performed by: EMERGENCY MEDICINE

## 2025-01-06 PROCEDURE — 96365 THER/PROPH/DIAG IV INF INIT: CPT

## 2025-01-06 PROCEDURE — 36415 COLL VENOUS BLD VENIPUNCTURE: CPT

## 2025-01-06 PROCEDURE — 99222 1ST HOSP IP/OBS MODERATE 55: CPT | Performed by: INTERNAL MEDICINE

## 2025-01-06 PROCEDURE — 74176 CT ABD & PELVIS W/O CONTRAST: CPT

## 2025-01-06 PROCEDURE — 96367 TX/PROPH/DG ADDL SEQ IV INF: CPT

## 2025-01-06 PROCEDURE — 93005 ELECTROCARDIOGRAM TRACING: CPT

## 2025-01-06 PROCEDURE — 84443 ASSAY THYROID STIM HORMONE: CPT

## 2025-01-06 RX ORDER — MAGNESIUM SULFATE HEPTAHYDRATE 40 MG/ML
2 INJECTION, SOLUTION INTRAVENOUS ONCE
Status: COMPLETED | OUTPATIENT
Start: 2025-01-06 | End: 2025-01-06

## 2025-01-06 RX ORDER — VANCOMYCIN HYDROCHLORIDE 1 G/200ML
1000 INJECTION, SOLUTION INTRAVENOUS ONCE
Status: COMPLETED | OUTPATIENT
Start: 2025-01-06 | End: 2025-01-06

## 2025-01-06 RX ADMIN — MAGNESIUM SULFATE HEPTAHYDRATE 2 G: 40 INJECTION, SOLUTION INTRAVENOUS at 17:50

## 2025-01-06 RX ADMIN — PIPERACILLIN SODIUM AND TAZOBACTAM SODIUM 3.38 G: 3; .375 INJECTION, SOLUTION INTRAVENOUS at 19:40

## 2025-01-06 RX ADMIN — VANCOMYCIN HYDROCHLORIDE 1000 MG: 1 INJECTION, SOLUTION INTRAVENOUS at 20:04

## 2025-01-06 RX ADMIN — SODIUM CHLORIDE 1000 ML: 9 INJECTION, SOLUTION INTRAVENOUS at 17:50

## 2025-01-06 ASSESSMENT — COLUMBIA-SUICIDE SEVERITY RATING SCALE - C-SSRS
2. HAVE YOU ACTUALLY HAD ANY THOUGHTS OF KILLING YOURSELF?: NO
6. HAVE YOU EVER DONE ANYTHING, STARTED TO DO ANYTHING, OR PREPARED TO DO ANYTHING TO END YOUR LIFE?: NO
1. IN THE PAST MONTH, HAVE YOU WISHED YOU WERE DEAD OR WISHED YOU COULD GO TO SLEEP AND NOT WAKE UP?: NO

## 2025-01-06 ASSESSMENT — PAIN SCALES - GENERAL: PAINLEVEL_OUTOF10: 0 - NO PAIN

## 2025-01-06 ASSESSMENT — PAIN - FUNCTIONAL ASSESSMENT: PAIN_FUNCTIONAL_ASSESSMENT: 0-10

## 2025-01-06 NOTE — ED PROVIDER NOTES
CC: No chief complaint on file.     HPI:  Patient is an 89-year-old female with a past medical history of A-fib on Eliquis, hypothyroidism, gout, and T2DM presents to the ED for syncope.  Per EMS, patient was noted to be on the toilet and had a syncopal episode after a bowel movement.  Patient was noted to have a large bowel movement.  Patient does not remember the event.  Patient states her name.  Patient is noted to be A&O X1 at baseline.    Limitations to history: Current mental status and baseline mental status  Independent historian(s): EMS  Records Reviewed: Recent available ED and inpatient notes reviewed in EMR.    PMHx/PSHx:  Per HPI.   - has no past medical history on file.  - has no past surgical history on file.    Medications:  Reviewed in EMR. See EMR for complete list of medications and doses.    Allergies:  Patient has no known allergies.    Social History:  - Tobacco:  reports that she has never smoked. She has never been exposed to tobacco smoke. She has never used smokeless tobacco.   - Alcohol:  reports no history of alcohol use.   - Illicit Drugs:  reports no history of drug use.     ROS:  Per HPI.       ???????????????????????????????????????????????????????????????  Triage Vitals:  T 36.3 °C (97.3 °F)  HR 69  BP (!) 131/93  RR 18  O2 97 %      Physical Exam  Vitals and nursing note reviewed.   Constitutional:       General: She is not in acute distress.  HENT:      Head: Normocephalic and atraumatic.      Nose: Nose normal.      Mouth/Throat:      Mouth: Mucous membranes are moist.      Comments: No oral trauma.  Eyes:      Conjunctiva/sclera: Conjunctivae normal.   Cardiovascular:      Rate and Rhythm: Normal rate and regular rhythm.      Pulses: Normal pulses.   Pulmonary:      Effort: Pulmonary effort is normal. No respiratory distress.      Breath sounds: Normal breath sounds.   Abdominal:      Palpations: Abdomen is soft.      Tenderness: There is no abdominal tenderness.    Genitourinary:     Comments: Light brown stool.  No melena.  Musculoskeletal:      Comments: TTP of the right hip, right femur, and right knee   Skin:     General: Skin is warm.   Neurological:      Mental Status: She is alert.      Cranial Nerves: No cranial nerve deficit.      Comments: A&O X1 to self.       ???????????????????????????????????????????????????????????????  Labs:   Labs Reviewed - No data to display     Imaging:   No orders to display        EKG:  Rate is 64, appears like atrial fibrillation, normal axis, no interval prolongation, no st elevation or depression.  When compared to EKG on 2/21/2024 review of EKG does not show any signs of STEMI, complete heart block, asystole, V-fib.    MDM:  Patient is an 89-year-old female with a past medical history of A-fib on Eliquis, hypothyroidism, gout, and T2DM presents to the ED for syncope.  Patient presented HDS.  Physical exam findings significant for patient & O X1 which is noted to be baseline as well as TTP of the right hip, right femur, and right knee.  Low clinical concern for seizure.  High clinical concern for vasovagal versus cardiogenic etiology of syncope.  Please see ED course and disposition for remainder of care.    ED Course:  ED Course as of 01/06/25 1847   Mon Jan 06, 2025   1633 BLOOD GAS VENOUS FULL PANEL(!!)  Venous full panel significant for respiratory acidosis and elevated lactate. [MH]   1647 B-type natriuretic peptide  BNP normal at 77. [MH]   1709 Troponin Series, (0, 1 HR)(!)  Low clinical concern for ACS with mildly elevated troponin no ischemic findings on EKG. [MH]   1710 Comprehensive metabolic panel(!)  Metabolic panel sent for RD and elevated BUN of 42. [MH]   1710 Magnesium(!)  Magnesium notably low at 1.59.  IV magnesium ordered. [MH]   1710 TSH with reflex to Free T4 if abnormal(!)  TSH elevated at 6.23. [MH]   1723 CT head wo IV contrast  IMPRESSION:  No acute intracranial abnormality.      Soft tissue swelling and  hematoma right frontal scalp. Soft tissue  swelling extends to the right zygoma and maxilla.      Chronic changes as noted above.   [MH]   1732 CT cervical spine wo IV contrast  IMPRESSION:  No acute fracture or traumatic subluxation of the cervical spine.      Multilevel discogenic degenerative changes as described above.      Generalized diffuse osteopenia.   [MH]   1816 Spoke with nurse at [facility] (Ceci Draper), who noted patient passed on toilet. Sent to [place] 2 days ago with negative CT head and face imaging per nursing facility.  []   1826 Thyroxine, Free  Free T4 normal [MH]   1845 FINDINGS:  Advanced right hip osteoarthritis.      Mild degenerative changes of the right knee.      No evidence of acute fracture right hip, knee, or femur.      IMPRESSION:  Advanced osteoarthritis right hip with mild degenerative changes of  the knee. No acute findings. []   1846 XR chest 1 view  IMPRESSION:  No evidence of acute intrathoracic abnormality.   []      ED Course User Index  [] Yury Trinidad MD       Social Determinants Limiting Care:  None identified    Disposition:  Patient care to be continued by Dr. Patrick pending CBC, repeat venous full panel, likely admission for cardiac workup with RD.    Yury Trinidad MD   Emergency Medicine PGY-3  [facility]    Comment: Please note this report has been produced using speech recognition software and may contain errors related to that system including errors in grammar, punctuation, and spelling as well as words and phrases that may be inappropriate.  If there are any questions or concerns please feel free to contact the dictating provider for clarification.    Procedures ? Smart[employer] last updated 1/6/2025 3:28 PM        Yury Trinidad MD  Resident  01/06/25 [id number]

## 2025-01-07 PROBLEM — A41.9 SEPSIS, DUE TO UNSPECIFIED ORGANISM, UNSPECIFIED WHETHER ACUTE ORGAN DYSFUNCTION PRESENT (MULTI): Status: ACTIVE | Noted: 2025-01-07

## 2025-01-07 LAB
ANION GAP SERPL CALC-SCNC: 9 MMOL/L (ref 10–20)
APPEARANCE UR: ABNORMAL
BACTERIA #/AREA URNS AUTO: ABNORMAL /HPF
BILIRUB UR STRIP.AUTO-MCNC: NEGATIVE MG/DL
BUN SERPL-MCNC: 34 MG/DL (ref 6–23)
CALCIUM SERPL-MCNC: 8.3 MG/DL (ref 8.6–10.3)
CHLORIDE SERPL-SCNC: 114 MMOL/L (ref 98–107)
CO2 SERPL-SCNC: 24 MMOL/L (ref 21–32)
COLOR UR: ABNORMAL
CREAT SERPL-MCNC: 1.2 MG/DL (ref 0.5–1.05)
EGFRCR SERPLBLD CKD-EPI 2021: 44 ML/MIN/1.73M*2
ERYTHROCYTE [DISTWIDTH] IN BLOOD BY AUTOMATED COUNT: 12.1 % (ref 11.5–14.5)
GLUCOSE SERPL-MCNC: 95 MG/DL (ref 74–99)
GLUCOSE UR STRIP.AUTO-MCNC: NORMAL MG/DL
HCT VFR BLD AUTO: 29.5 % (ref 36–46)
HGB BLD-MCNC: 9.7 G/DL (ref 12–16)
HOLD SPECIMEN: NORMAL
KETONES UR STRIP.AUTO-MCNC: NEGATIVE MG/DL
LEUKOCYTE ESTERASE UR QL STRIP.AUTO: ABNORMAL
MCH RBC QN AUTO: 29.8 PG (ref 26–34)
MCHC RBC AUTO-ENTMCNC: 32.9 G/DL (ref 32–36)
MCV RBC AUTO: 91 FL (ref 80–100)
MUCOUS THREADS #/AREA URNS AUTO: ABNORMAL /LPF
NITRITE UR QL STRIP.AUTO: NEGATIVE
NRBC BLD-RTO: 0 /100 WBCS (ref 0–0)
PH UR STRIP.AUTO: 5 [PH]
PLATELET # BLD AUTO: 119 X10*3/UL (ref 150–450)
POTASSIUM SERPL-SCNC: 4 MMOL/L (ref 3.5–5.3)
PROT UR STRIP.AUTO-MCNC: NEGATIVE MG/DL
RBC # BLD AUTO: 3.25 X10*6/UL (ref 4–5.2)
RBC # UR STRIP.AUTO: NEGATIVE /UL
RBC #/AREA URNS AUTO: ABNORMAL /HPF
SODIUM SERPL-SCNC: 143 MMOL/L (ref 136–145)
SP GR UR STRIP.AUTO: 1.02
SQUAMOUS #/AREA URNS AUTO: ABNORMAL /HPF
UROBILINOGEN UR STRIP.AUTO-MCNC: NORMAL MG/DL
VANCOMYCIN TROUGH SERPL-MCNC: 7.9 UG/ML (ref 5–20)
WBC # BLD AUTO: 10 X10*3/UL (ref 4.4–11.3)
WBC #/AREA URNS AUTO: ABNORMAL /HPF
WBC CLUMPS #/AREA URNS AUTO: ABNORMAL /HPF

## 2025-01-07 PROCEDURE — 36415 COLL VENOUS BLD VENIPUNCTURE: CPT | Performed by: INTERNAL MEDICINE

## 2025-01-07 PROCEDURE — 87899 AGENT NOS ASSAY W/OPTIC: CPT | Mod: AHULAB | Performed by: INTERNAL MEDICINE

## 2025-01-07 PROCEDURE — 80202 ASSAY OF VANCOMYCIN: CPT | Performed by: INTERNAL MEDICINE

## 2025-01-07 PROCEDURE — 80048 BASIC METABOLIC PNL TOTAL CA: CPT | Performed by: INTERNAL MEDICINE

## 2025-01-07 PROCEDURE — 2500000002 HC RX 250 W HCPCS SELF ADMINISTERED DRUGS (ALT 637 FOR MEDICARE OP, ALT 636 FOR OP/ED): Performed by: INTERNAL MEDICINE

## 2025-01-07 PROCEDURE — 2500000001 HC RX 250 WO HCPCS SELF ADMINISTERED DRUGS (ALT 637 FOR MEDICARE OP): Performed by: INTERNAL MEDICINE

## 2025-01-07 PROCEDURE — 81001 URINALYSIS AUTO W/SCOPE: CPT

## 2025-01-07 PROCEDURE — 85027 COMPLETE CBC AUTOMATED: CPT | Performed by: INTERNAL MEDICINE

## 2025-01-07 PROCEDURE — 1200000002 HC GENERAL ROOM WITH TELEMETRY DAILY

## 2025-01-07 PROCEDURE — 2500000004 HC RX 250 GENERAL PHARMACY W/ HCPCS (ALT 636 FOR OP/ED): Performed by: INTERNAL MEDICINE

## 2025-01-07 PROCEDURE — 87086 URINE CULTURE/COLONY COUNT: CPT | Mod: AHULAB

## 2025-01-07 PROCEDURE — 87449 NOS EACH ORGANISM AG IA: CPT | Mod: AHULAB | Performed by: INTERNAL MEDICINE

## 2025-01-07 PROCEDURE — 99222 1ST HOSP IP/OBS MODERATE 55: CPT

## 2025-01-07 PROCEDURE — 99233 SBSQ HOSP IP/OBS HIGH 50: CPT | Performed by: INTERNAL MEDICINE

## 2025-01-07 PROCEDURE — 86738 MYCOPLASMA ANTIBODY: CPT | Performed by: INTERNAL MEDICINE

## 2025-01-07 RX ORDER — ACETAMINOPHEN 160 MG/5ML
650 SOLUTION ORAL EVERY 4 HOURS PRN
Status: DISCONTINUED | OUTPATIENT
Start: 2025-01-07 | End: 2025-01-08 | Stop reason: HOSPADM

## 2025-01-07 RX ORDER — ONDANSETRON HYDROCHLORIDE 2 MG/ML
4 INJECTION, SOLUTION INTRAVENOUS EVERY 8 HOURS PRN
Status: DISCONTINUED | OUTPATIENT
Start: 2025-01-07 | End: 2025-01-08 | Stop reason: HOSPADM

## 2025-01-07 RX ORDER — VANCOMYCIN HYDROCHLORIDE 1 G/200ML
1000 INJECTION, SOLUTION INTRAVENOUS ONCE
Status: DISCONTINUED | OUTPATIENT
Start: 2025-01-07 | End: 2025-01-07

## 2025-01-07 RX ORDER — AZITHROMYCIN 250 MG/1
250 TABLET, FILM COATED ORAL EVERY 24 HOURS
Status: DISCONTINUED | OUTPATIENT
Start: 2025-01-07 | End: 2025-01-08 | Stop reason: HOSPADM

## 2025-01-07 RX ORDER — ACETAMINOPHEN 650 MG/1
650 SUPPOSITORY RECTAL EVERY 4 HOURS PRN
Status: DISCONTINUED | OUTPATIENT
Start: 2025-01-07 | End: 2025-01-08 | Stop reason: HOSPADM

## 2025-01-07 RX ORDER — METOPROLOL TARTRATE 25 MG/1
25 TABLET, FILM COATED ORAL DAILY
Status: DISCONTINUED | OUTPATIENT
Start: 2025-01-07 | End: 2025-01-08 | Stop reason: HOSPADM

## 2025-01-07 RX ORDER — SERTRALINE HYDROCHLORIDE 50 MG/1
50 TABLET, FILM COATED ORAL DAILY
Status: DISCONTINUED | OUTPATIENT
Start: 2025-01-07 | End: 2025-01-08 | Stop reason: HOSPADM

## 2025-01-07 RX ORDER — CHOLECALCIFEROL (VITAMIN D3) 25 MCG
1000 TABLET ORAL DAILY
Status: DISCONTINUED | OUTPATIENT
Start: 2025-01-07 | End: 2025-01-08 | Stop reason: HOSPADM

## 2025-01-07 RX ORDER — ONDANSETRON 4 MG/1
4 TABLET, FILM COATED ORAL EVERY 8 HOURS PRN
Status: DISCONTINUED | OUTPATIENT
Start: 2025-01-07 | End: 2025-01-08 | Stop reason: HOSPADM

## 2025-01-07 RX ORDER — PANTOPRAZOLE SODIUM 40 MG/1
40 TABLET, DELAYED RELEASE ORAL
Status: DISCONTINUED | OUTPATIENT
Start: 2025-01-07 | End: 2025-01-08 | Stop reason: HOSPADM

## 2025-01-07 RX ORDER — QUETIAPINE FUMARATE 25 MG/1
25 TABLET, FILM COATED ORAL 2 TIMES DAILY
Status: DISCONTINUED | OUTPATIENT
Start: 2025-01-07 | End: 2025-01-08 | Stop reason: HOSPADM

## 2025-01-07 RX ORDER — CEFTRIAXONE 1 G/50ML
1 INJECTION, SOLUTION INTRAVENOUS EVERY 24 HOURS
Status: DISCONTINUED | OUTPATIENT
Start: 2025-01-07 | End: 2025-01-08 | Stop reason: HOSPADM

## 2025-01-07 RX ORDER — MEMANTINE HYDROCHLORIDE 10 MG/1
10 TABLET ORAL EVERY 12 HOURS
COMMUNITY
Start: 2025-01-03

## 2025-01-07 RX ORDER — LISINOPRIL 10 MG/1
10 TABLET ORAL DAILY
Status: DISCONTINUED | OUTPATIENT
Start: 2025-01-07 | End: 2025-01-08 | Stop reason: HOSPADM

## 2025-01-07 RX ORDER — VIT C/E/ZN/COPPR/LUTEIN/ZEAXAN 250MG-90MG
500 CAPSULE ORAL DAILY
Status: DISCONTINUED | OUTPATIENT
Start: 2025-01-07 | End: 2025-01-08 | Stop reason: HOSPADM

## 2025-01-07 RX ORDER — DIVALPROEX SODIUM 125 MG/1
125 CAPSULE, COATED PELLETS ORAL 2 TIMES DAILY
Status: DISCONTINUED | OUTPATIENT
Start: 2025-01-07 | End: 2025-01-08 | Stop reason: HOSPADM

## 2025-01-07 RX ORDER — PANTOPRAZOLE SODIUM 40 MG/10ML
40 INJECTION, POWDER, LYOPHILIZED, FOR SOLUTION INTRAVENOUS
Status: DISCONTINUED | OUTPATIENT
Start: 2025-01-07 | End: 2025-01-08 | Stop reason: HOSPADM

## 2025-01-07 RX ORDER — VANCOMYCIN HYDROCHLORIDE 1 G/20ML
INJECTION, POWDER, LYOPHILIZED, FOR SOLUTION INTRAVENOUS DAILY PRN
Status: DISCONTINUED | OUTPATIENT
Start: 2025-01-07 | End: 2025-01-08 | Stop reason: HOSPADM

## 2025-01-07 RX ORDER — ACETAMINOPHEN 325 MG/1
650 TABLET ORAL EVERY 4 HOURS PRN
Status: DISCONTINUED | OUTPATIENT
Start: 2025-01-07 | End: 2025-01-08 | Stop reason: HOSPADM

## 2025-01-07 RX ADMIN — Medication 1000 UNITS: at 10:01

## 2025-01-07 RX ADMIN — METOPROLOL TARTRATE 25 MG: 25 TABLET, FILM COATED ORAL at 10:01

## 2025-01-07 RX ADMIN — QUETIAPINE FUMARATE 25 MG: 25 TABLET ORAL at 20:01

## 2025-01-07 RX ADMIN — DIVALPROEX SODIUM 125 MG: 125 CAPSULE ORAL at 10:00

## 2025-01-07 RX ADMIN — APIXABAN 2.5 MG: 5 TABLET, FILM COATED ORAL at 10:01

## 2025-01-07 RX ADMIN — DIVALPROEX SODIUM 125 MG: 125 CAPSULE ORAL at 02:27

## 2025-01-07 RX ADMIN — QUETIAPINE FUMARATE 25 MG: 25 TABLET ORAL at 02:27

## 2025-01-07 RX ADMIN — CEFTRIAXONE SODIUM 1 G: 1 INJECTION, SOLUTION INTRAVENOUS at 17:12

## 2025-01-07 RX ADMIN — CYANOCOBALAMIN TAB 500 MCG 500 MCG: 500 TAB at 10:01

## 2025-01-07 RX ADMIN — AZITHROMYCIN DIHYDRATE 250 MG: 250 TABLET ORAL at 17:12

## 2025-01-07 RX ADMIN — APIXABAN 2.5 MG: 5 TABLET, FILM COATED ORAL at 20:01

## 2025-01-07 RX ADMIN — VANCOMYCIN HYDROCHLORIDE 1250 MG: 1.25 INJECTION, POWDER, LYOPHILIZED, FOR SOLUTION INTRAVENOUS at 22:38

## 2025-01-07 RX ADMIN — PANTOPRAZOLE SODIUM 40 MG: 40 TABLET, DELAYED RELEASE ORAL at 06:51

## 2025-01-07 RX ADMIN — DIVALPROEX SODIUM 125 MG: 125 CAPSULE ORAL at 20:01

## 2025-01-07 RX ADMIN — SERTRALINE 50 MG: 50 TABLET, FILM COATED ORAL at 10:00

## 2025-01-07 RX ADMIN — QUETIAPINE FUMARATE 25 MG: 25 TABLET ORAL at 10:01

## 2025-01-07 SDOH — ECONOMIC STABILITY: FOOD INSECURITY
WITHIN THE PAST 12 MONTHS, THE FOOD YOU BOUGHT JUST DIDN'T LAST AND YOU DIDN'T HAVE MONEY TO GET MORE.: PATIENT UNABLE TO ANSWER

## 2025-01-07 SDOH — SOCIAL STABILITY: SOCIAL INSECURITY: DO YOU FEEL UNSAFE GOING BACK TO THE PLACE WHERE YOU ARE LIVING?: NO

## 2025-01-07 SDOH — SOCIAL STABILITY: SOCIAL INSECURITY
WITHIN THE LAST YEAR, HAVE YOU BEEN RAPED OR FORCED TO HAVE ANY KIND OF SEXUAL ACTIVITY BY YOUR PARTNER OR EX-PARTNER?: NO

## 2025-01-07 SDOH — SOCIAL STABILITY: SOCIAL INSECURITY: WITHIN THE LAST YEAR, HAVE YOU BEEN HUMILIATED OR EMOTIONALLY ABUSED IN OTHER WAYS BY YOUR PARTNER OR EX-PARTNER?: NO

## 2025-01-07 SDOH — SOCIAL STABILITY: SOCIAL INSECURITY: DO YOU FEEL ANYONE HAS EXPLOITED OR TAKEN ADVANTAGE OF YOU FINANCIALLY OR OF YOUR PERSONAL PROPERTY?: NO

## 2025-01-07 SDOH — SOCIAL STABILITY: SOCIAL INSECURITY: WITHIN THE LAST YEAR, HAVE YOU BEEN AFRAID OF YOUR PARTNER OR EX-PARTNER?: NO

## 2025-01-07 SDOH — SOCIAL STABILITY: SOCIAL INSECURITY: HAVE YOU HAD THOUGHTS OF HARMING ANYONE ELSE?: NO

## 2025-01-07 SDOH — ECONOMIC STABILITY: INCOME INSECURITY: IN THE PAST 12 MONTHS HAS THE ELECTRIC, GAS, OIL, OR WATER COMPANY THREATENED TO SHUT OFF SERVICES IN YOUR HOME?: NO

## 2025-01-07 SDOH — SOCIAL STABILITY: SOCIAL INSECURITY: DOES ANYONE TRY TO KEEP YOU FROM HAVING/CONTACTING OTHER FRIENDS OR DOING THINGS OUTSIDE YOUR HOME?: NO

## 2025-01-07 SDOH — SOCIAL STABILITY: SOCIAL INSECURITY: HAVE YOU HAD ANY THOUGHTS OF HARMING ANYONE ELSE?: NO

## 2025-01-07 SDOH — SOCIAL STABILITY: SOCIAL INSECURITY
WITHIN THE LAST YEAR, HAVE YOU BEEN KICKED, HIT, SLAPPED, OR OTHERWISE PHYSICALLY HURT BY YOUR PARTNER OR EX-PARTNER?: NO

## 2025-01-07 SDOH — SOCIAL STABILITY: SOCIAL INSECURITY: ARE THERE ANY APPARENT SIGNS OF INJURIES/BEHAVIORS THAT COULD BE RELATED TO ABUSE/NEGLECT?: NO

## 2025-01-07 SDOH — SOCIAL STABILITY: SOCIAL INSECURITY: HAS ANYONE EVER THREATENED TO HURT YOUR FAMILY OR YOUR PETS?: NO

## 2025-01-07 SDOH — SOCIAL STABILITY: SOCIAL INSECURITY: ABUSE: ADULT

## 2025-01-07 SDOH — SOCIAL STABILITY: SOCIAL INSECURITY: WERE YOU ABLE TO COMPLETE ALL THE BEHAVIORAL HEALTH SCREENINGS?: YES

## 2025-01-07 SDOH — SOCIAL STABILITY: SOCIAL INSECURITY: ARE YOU OR HAVE YOU BEEN THREATENED OR ABUSED PHYSICALLY, EMOTIONALLY, OR SEXUALLY BY ANYONE?: NO

## 2025-01-07 SDOH — ECONOMIC STABILITY: FOOD INSECURITY
WITHIN THE PAST 12 MONTHS, YOU WORRIED THAT YOUR FOOD WOULD RUN OUT BEFORE YOU GOT THE MONEY TO BUY MORE.: PATIENT UNABLE TO ANSWER

## 2025-01-07 ASSESSMENT — ACTIVITIES OF DAILY LIVING (ADL)
JUDGMENT_ADEQUATE_SAFELY_COMPLETE_DAILY_ACTIVITIES: NO
ADEQUATE_TO_COMPLETE_ADL: YES
GROOMING: NEEDS ASSISTANCE
TOILETING: DEPENDENT
PATIENT'S MEMORY ADEQUATE TO SAFELY COMPLETE DAILY ACTIVITIES?: NO
HEARING - RIGHT EAR: DIFFICULTY WITH NOISE
LACK_OF_TRANSPORTATION: PATIENT UNABLE TO ANSWER
DRESSING YOURSELF: NEEDS ASSISTANCE
BATHING: NEEDS ASSISTANCE
WALKS IN HOME: NEEDS ASSISTANCE
HEARING - LEFT EAR: DIFFICULTY WITH NOISE
ASSISTIVE_DEVICE: WALKER
LACK_OF_TRANSPORTATION: PATIENT UNABLE TO ANSWER
FEEDING YOURSELF: INDEPENDENT

## 2025-01-07 ASSESSMENT — COGNITIVE AND FUNCTIONAL STATUS - GENERAL
DAILY ACTIVITIY SCORE: 18
WALKING IN HOSPITAL ROOM: A LOT
PATIENT BASELINE BEDBOUND: NO
TURNING FROM BACK TO SIDE WHILE IN FLAT BAD: A LITTLE
CLIMB 3 TO 5 STEPS WITH RAILING: TOTAL
TURNING FROM BACK TO SIDE WHILE IN FLAT BAD: A LITTLE
STANDING UP FROM CHAIR USING ARMS: A LITTLE
DRESSING REGULAR UPPER BODY CLOTHING: A LITTLE
DRESSING REGULAR LOWER BODY CLOTHING: A LITTLE
STANDING UP FROM CHAIR USING ARMS: A LITTLE
DAILY ACTIVITIY SCORE: 18
DRESSING REGULAR UPPER BODY CLOTHING: A LITTLE
MOVING TO AND FROM BED TO CHAIR: A LITTLE
PERSONAL GROOMING: A LITTLE
MOBILITY SCORE: 16
CLIMB 3 TO 5 STEPS WITH RAILING: TOTAL
MOVING TO AND FROM BED TO CHAIR: A LITTLE
PERSONAL GROOMING: A LITTLE
TOILETING: A LOT
MOBILITY SCORE: 16
TOILETING: A LOT
WALKING IN HOSPITAL ROOM: A LOT
HELP NEEDED FOR BATHING: A LITTLE
HELP NEEDED FOR BATHING: A LITTLE
DRESSING REGULAR LOWER BODY CLOTHING: A LITTLE

## 2025-01-07 ASSESSMENT — ENCOUNTER SYMPTOMS
ACTIVITY CHANGE: 1
FATIGUE: 1
RESPIRATORY NEGATIVE: 1
HEMATOLOGIC/LYMPHATIC NEGATIVE: 1
MUSCULOSKELETAL NEGATIVE: 1
EYES NEGATIVE: 1
ALLERGIC/IMMUNOLOGIC NEGATIVE: 1
PSYCHIATRIC NEGATIVE: 1
GASTROINTESTINAL NEGATIVE: 1
ENDOCRINE NEGATIVE: 1
CARDIOVASCULAR NEGATIVE: 1

## 2025-01-07 ASSESSMENT — LIFESTYLE VARIABLES
SUBSTANCE_ABUSE_PAST_12_MONTHS: NO
SKIP TO QUESTIONS 9-10: 1
HOW OFTEN DO YOU HAVE A DRINK CONTAINING ALCOHOL: NEVER
PRESCIPTION_ABUSE_PAST_12_MONTHS: NO
AUDIT-C TOTAL SCORE: 0
AUDIT-C TOTAL SCORE: 0
HOW MANY STANDARD DRINKS CONTAINING ALCOHOL DO YOU HAVE ON A TYPICAL DAY: PATIENT DOES NOT DRINK
HOW OFTEN DO YOU HAVE 6 OR MORE DRINKS ON ONE OCCASION: NEVER

## 2025-01-07 ASSESSMENT — PAIN - FUNCTIONAL ASSESSMENT
PAIN_FUNCTIONAL_ASSESSMENT: 0-10
PAIN_FUNCTIONAL_ASSESSMENT: 0-10

## 2025-01-07 ASSESSMENT — PAIN SCALES - GENERAL
PAINLEVEL_OUTOF10: 0 - NO PAIN

## 2025-01-07 NOTE — H&P
History Of Present Illness  Airelle Alcaraz is a 89 y.o. female with a past medical history of hypertension, type 2 diabetes mellitus, gout, iron deficiency anemia, hypothyroidism, chronic atrial fibrillation on apixaban, presenting to [facility] ER for syncope.  Per EMS patient was noted to be on the toilet and had a syncopal episode after a bowel movement.  Does not remember the syncopal event.  She is awake and alert and oriented x 1 at baseline.  She is able to recall her name.     Past Medical History  Hypertension  Type 2 diabetes mellitus  Gout  Iron deficiency anemia  Hypothyroidism  Chronic atrial fibrillation on apixaban    Surgical History  No past surgical history on file.      Social History  She reports that she has never smoked. She has never been exposed to tobacco smoke. She has never used smokeless tobacco. She reports that she does not drink alcohol and does not use drugs.    Family History  No family history on file.     Allergies  Patient has no known allergies.    Review of Systems   Constitutional:  Positive for activity change and fatigue.   HENT: Negative.     Eyes: Negative.    Respiratory: Negative.     Cardiovascular: Negative.    Gastrointestinal: Negative.    Endocrine: Negative.    Genitourinary: Negative.    Musculoskeletal: Negative.    Skin: Negative.    Allergic/Immunologic: Negative.    Neurological:  Positive for syncope.   Hematological: Negative.    Psychiatric/Behavioral: Negative.     All other systems reviewed and are negative.       Physical Exam  Vitals and nursing note reviewed.   Constitutional:       Appearance: Normal appearance. She is ill-appearing.   HENT:      Head: Normocephalic.      Right Ear: External ear normal.      Left Ear: External ear normal.      Nose: Nose normal.      Mouth/Throat:      Mouth: Mucous membranes are dry.      Pharynx: Oropharynx is clear.   Eyes:      Extraocular Movements: Extraocular movements intact.      Conjunctiva/sclera:  Conjunctivae normal.      Pupils: Pupils are equal, round, and reactive to light.   Cardiovascular:      Rate and Rhythm: Normal rate. Rhythm irregular.   Pulmonary:      Effort: Pulmonary effort is normal.      Breath sounds: Rales present.      Comments: Right lower lobe rales  Abdominal:      General: Abdomen is flat. Bowel sounds are normal.      Palpations: Abdomen is soft.      Tenderness: There is abdominal tenderness.   Musculoskeletal:         General: Normal range of motion.   Skin:     General: Skin is warm and dry.   Neurological:      General: No focal deficit present.      Mental Status: She is alert. Mental status is at baseline.   Psychiatric:         Mood and Affect: Mood normal.         Behavior: Behavior normal.          Last Recorded Vitals  Blood pressure 104/63, pulse 63, temperature 36.3 °C (97.3 °F), resp. rate 17, SpO2 98%.    Relevant Results  Meds:  Scheduled medications    Continuous medications    PRN medications     Current Outpatient Medications   Medication Instructions    acetaminophen (TYLENOL) 1,000 mg, oral, 2 times daily PRN    apixaban (ELIQUIS) 5 mg, oral, 2 times daily    cholecalciferol (VITAMIN D-3) 1,000 Units, oral, Daily    cyanocobalamin (VITAMIN B-12) 500 mcg, oral, Daily    divalproex sprinkle (DEPAKOTE SPRINKLE) 125 mg, oral, 2 times daily    lisinopril 10 mg, oral, Daily    metoprolol tartrate (LOPRESSOR) 25 mg, oral, Daily    multivitamin-children's (Cerovite, Jr) chewable tablet 1 tablet, oral, Daily    pantoprazole (PROTONIX) 40 mg, oral, Every 12 hours, Do not crush, chew, or split.    psyllium (Metamucil Fiber Singles) 3.4 gram packet Take 1 packet by mouth once daily. Mix with water as directed before drinking    QUEtiapine (SEROQUEL) 25 mg, oral, 2 times daily    sertraline (ZOLOFT) 25 mg, oral, Daily        Labs:  Results for orders placed or performed during the hospital encounter of 01/06/25 (from the past 24 hours)   ECG 12 lead   Result Value Ref Range     Ventricular Rate 64 BPM    Atrial Rate 66 BPM    AR Interval 188 ms    QRS Duration 98 ms    QT Interval 450 ms    QTC Calculation(Bazett) 464 ms    P Axis 44 degrees    R Axis -55 degrees    T Axis 56 degrees    QRS Count 10 beats    Q Onset 211 ms    P Onset 117 ms    P Offset 150 ms    T Offset 436 ms    QTC Fredericia 459 ms   Comprehensive metabolic panel   Result Value Ref Range    Glucose 134 (H) 74 - 99 mg/dL    Sodium 142 136 - 145 mmol/L    Potassium 4.3 3.5 - 5.3 mmol/L    Chloride 109 (H) 98 - 107 mmol/L    Bicarbonate 23 21 - 32 mmol/L    Anion Gap 14 10 - 20 mmol/L    Urea Nitrogen 42 (H) 6 - 23 mg/dL    Creatinine 1.64 (H) 0.50 - 1.05 mg/dL    eGFR 30 (L) >60 mL/min/1.73m*2    Calcium 9.1 8.6 - 10.3 mg/dL    Albumin 3.7 3.4 - 5.0 g/dL    Alkaline Phosphatase 88 33 - 136 U/L    Total Protein 6.6 6.4 - 8.2 g/dL    AST 21 9 - 39 U/L    Bilirubin, Total 0.5 0.0 - 1.2 mg/dL    ALT 10 7 - 45 U/L   Magnesium   Result Value Ref Range    Magnesium 1.59 (L) 1.60 - 2.40 mg/dL   BLOOD GAS VENOUS FULL PANEL   Result Value Ref Range    POCT pH, Venous 7.23 (LL) 7.33 - 7.43 pH    POCT pCO2, Venous 55 (H) 41 - 51 mm Hg    POCT pO2, Venous 32 (L) 35 - 45 mm Hg    POCT SO2, Venous 42 (L) 45 - 75 %    POCT Oxy Hemoglobin, Venous 41.2 (L) 45.0 - 75.0 %    POCT Hematocrit Calculated, Venous 35.0 (L) 36.0 - 46.0 %    POCT Sodium, Venous 140 136 - 145 mmol/L    POCT Potassium, Venous 4.3 3.5 - 5.3 mmol/L    POCT Chloride, Venous 108 (H) 98 - 107 mmol/L    POCT Ionized Calicum, Venous 1.24 1.10 - 1.33 mmol/L    POCT Glucose, Venous 131 (H) 74 - 99 mg/dL    POCT Lactate, Venous 4.0 (HH) 0.4 - 2.0 mmol/L    POCT Base Excess, Venous -5.0 (L) -2.0 - 3.0 mmol/L    POCT HCO3 Calculated, Venous 23.0 22.0 - 26.0 mmol/L    POCT Hemoglobin, Venous 11.6 (L) 12.0 - 16.0 g/dL    POCT Anion Gap, Venous 13.0 10.0 - 25.0 mmol/L    Patient Temperature 37.0 degrees Celsius    FiO2 21 %   B-type natriuretic peptide   Result Value Ref Range     BNP 77 0 - 99 pg/mL   TSH with reflex to Free T4 if abnormal   Result Value Ref Range    Thyroid Stimulating Hormone 6.23 (H) 0.44 - 3.98 mIU/L   Troponin I, High Sensitivity, Initial   Result Value Ref Range    Troponin I, High Sensitivity 26 (H) 0 - 13 ng/L   Thyroxine, Free   Result Value Ref Range    Thyroxine, Free 0.89 0.61 - 1.12 ng/dL   Troponin, High Sensitivity, 1 Hour   Result Value Ref Range    Troponin I, High Sensitivity 18 (H) 0 - 13 ng/L   CBC and Auto Differential   Result Value Ref Range    WBC 12.2 (H) 4.4 - 11.3 x10*3/uL    nRBC 0.0 0.0 - 0.0 /100 WBCs    RBC 3.75 (L) 4.00 - 5.20 x10*6/uL    Hemoglobin 10.9 (L) 12.0 - 16.0 g/dL    Hematocrit 33.8 (L) 36.0 - 46.0 %    MCV 90 80 - 100 fL    MCH 29.1 26.0 - 34.0 pg    MCHC 32.2 32.0 - 36.0 g/dL    RDW 12.2 11.5 - 14.5 %    Platelets 130 (L) 150 - 450 x10*3/uL    Neutrophils % 89.8 40.0 - 80.0 %    Immature Granulocytes %, Automated 0.3 0.0 - 0.9 %    Lymphocytes % 3.0 13.0 - 44.0 %    Monocytes % 6.6 2.0 - 10.0 %    Eosinophils % 0.1 0.0 - 6.0 %    Basophils % 0.2 0.0 - 2.0 %    Neutrophils Absolute 10.95 (H) 1.60 - 5.50 x10*3/uL    Immature Granulocytes Absolute, Automated 0.04 0.00 - 0.50 x10*3/uL    Lymphocytes Absolute 0.37 (L) 0.80 - 3.00 x10*3/uL    Monocytes Absolute 0.80 0.05 - 0.80 x10*3/uL    Eosinophils Absolute 0.01 0.00 - 0.40 x10*3/uL    Basophils Absolute 0.02 0.00 - 0.10 x10*3/uL   Sars-CoV-2 and Influenza A/B PCR   Result Value Ref Range    Flu A Result Not Detected Not Detected    Flu B Result Not Detected Not Detected    Coronavirus 2019, PCR Not Detected Not Detected   BLOOD GAS VENOUS FULL PANEL   Result Value Ref Range    POCT pH, Venous 7.33 7.33 - 7.43 pH    POCT pCO2, Venous 46 41 - 51 mm Hg    POCT pO2, Venous 38 35 - 45 mm Hg    POCT SO2, Venous 62 45 - 75 %    POCT Oxy Hemoglobin, Venous 61.3 45.0 - 75.0 %    POCT Hematocrit Calculated, Venous 31.0 (L) 36.0 - 46.0 %    POCT Sodium, Venous 138 136 - 145 mmol/L    POCT  Potassium, Venous 4.0 3.5 - 5.3 mmol/L    POCT Chloride, Venous 110 (H) 98 - 107 mmol/L    POCT Ionized Calicum, Venous 1.20 1.10 - 1.33 mmol/L    POCT Glucose, Venous 198 (H) 74 - 99 mg/dL    POCT Lactate, Venous 1.6 0.4 - 2.0 mmol/L    POCT Base Excess, Venous -1.8 -2.0 - 3.0 mmol/L    POCT HCO3 Calculated, Venous 24.3 22.0 - 26.0 mmol/L    POCT Hemoglobin, Venous 10.2 (L) 12.0 - 16.0 g/dL    POCT Anion Gap, Venous 8.0 (L) 10.0 - 25.0 mmol/L    Patient Temperature 37.0 degrees Celsius    FiO2 21 %   Lactate   Result Value Ref Range    Lactate 1.4 0.4 - 2.0 mmol/L      Imaging:  CT chest abdomen pelvis wo IV contrast    Result Date: 1/6/2025  Interpreted By:  Blanche Templeton, STUDY: CT CHEST ABDOMEN PELVIS WO CONTRAST;  1/6/2025 9:06 pm   INDICATION: Signs/Symptoms:concern for sepsis, altered, unable to provide significant history.   COMPARISON: CT scan of the abdomen pelvis 02/19/2024.   ACCESSION NUMBER(S): [id number]   ORDERING CLINICIAN: IZZY DOWELL   TECHNIQUE: Axial CT images of the chest, abdomen and pelvis with coronal and sagittal reconstructed images obtained without contrast.   FINDINGS: CHEST:   VESSELS: Aorta is normal caliber. Atherosclerotic changes in the aorta and arch vessels. Lack of contrast limits evaluation of the vasculature. HEART: Normal size. No pericardial effusion. Aortic root and coronary artery calcifications noted. MEDIASTINUM AND VIDHI: No axillary or mediastinal adenopathy. Lack of contrast limits evaluation of the vidhi. LUNG, PLEURA, LARGE AIRWAYS: Peripheral reticular opacities bilaterally likely relate to chronic fibrotic changes. No consolidation, effusion or pneumothorax. There focal airspace opacity in the right lower lobe abutting the minor fissure as seen on axial image 199 of series 608. Calcified granulomas noted in the right upper lobe. CHEST WALL AND LOWER NECK: Within normal limits. BONES: Multilevel degenerative changes of the spine.   ABDOMEN: Lack of contrast  limits evaluation of the solid viscera and vasculature.   LIVER: Normal morphology and attenuation. BILE DUCTS: Mild central biliary ductal prominence is likely postsurgical. GALLBLADDER: Status post cholecystectomy. PANCREAS: Moderate fatty atrophy of the pancreas. SPLEEN: Within normal limits. Calcified splenic granuloma noted. ADRENALS: Within normal limits. KIDNEYS: Kidneys are symmetric in size without evidence for calculi, hydronephrosis, hydroureter or perinephric inflammatory changes. There are bilateral hypodense renal lesions which are incompletely characterized on this unenhanced CT but stable.   VESSELS: Calcific atherosclerosis of the aortoiliac and mesenteric vessels with mild tortuosity. No aortic aneurysm. RETROPERITONEUM: Within normal limits.   PELVIS:   REPRODUCTIVE ORGANS: Uterus is surgically absent. No adnexal mass. BLADDER: Within normal limits. Tiny locule of air noted in the bladder.   BOWEL: Normal caliber. Appendix is not identified with certainty. No pericecal inflammatory changes seen. Air-fluid levels noted in the colon extending to level of the rectum. Question mild bowel wall thickening in the region of the descending and sigmoid colon. These findings raise concern for infectious/inflammatory colitis. No pneumatosis or portal venous gas. PERITONEUM: No ascites or free air, no fluid collection.   ABDOMINAL WALL: Within normal limits. BONES: Generalized diffuse osteopenia. Multilevel degenerative changes of the spine. Advanced right and mild left hip osteoarthrosis. Mild subcutaneous soft tissue stranding left gluteal region.       There focal airspace opacity in the right lower lobe concerning for developing airspace disease. Correlate clinically and posttreatment follow-up is recommended to ensure complete resolution and exclude underlying mass lesion..   Peripheral reticular opacities bilaterally likely relate to chronic fibrotic changes.   Air-fluid levels noted in the colon  extending to level of the rectum. Question mild bowel wall thickening in the region of the descending and sigmoid colon. These findings raise concern for infectious/inflammatory colitis. Correlate for history of diarrhea   Status post cholecystectomy mild central biliary ductal prominence is likely postsurgical.   There are bilateral hypodense renal lesions which are incompletely characterized on this unenhanced CT but stable.   Tiny locule of air noted in the bladder. Correlate for history of any recent instrumentation. In the absence of such history findings raise concern for infection. Correlate with urinalysis.   Mild subcutaneous soft tissue stranding left gluteal region. Correlate with exam and history to exclude soft tissue injury.   Additional findings as noted above.   MACRO: None   Signed by: Blanche Templeton 1/6/2025 9:32 PM Dictation workstation:   [id number]    XR pelvis 1-2 views    Result Date: 1/6/2025  Interpreted By:  Sam Grande, STUDY: XR PELVIS 1-2 VIEWS; XR KNEE RIGHT 4+ VIEWS; XR FEMUR RIGHT 2+ VIEWS   INDICATION: Signs/Symptoms:LOC on toilet; Signs/Symptoms:TTP.   COMPARISON: None   ACCESSION NUMBER(S): [id number]; [id number]; [id number]   ORDERING CLINICIAN: IZZY DOWELL   FINDINGS: Advanced right hip osteoarthritis.   Mild degenerative changes of the right knee.   No evidence of acute fracture right hip, knee, or femur.       Advanced osteoarthritis right hip with mild degenerative changes of the knee. No acute findings.   Signed by: Sam Grande 1/6/2025 6:34 PM Dictation workstation:   [id number]    XR knee right 4+ views    Result Date: 1/6/2025  Interpreted By:  Sam Grande, STUDY: XR PELVIS 1-2 VIEWS; XR KNEE RIGHT 4+ VIEWS; XR FEMUR RIGHT 2+ VIEWS   INDICATION: Signs/Symptoms:LOC on toilet; Signs/Symptoms:TTP.   COMPARISON: None   ACCESSION NUMBER(S): [id number]; [id number]; [id number]   ORDERING CLINICIAN: IZZY DOWELL   FINDINGS: Advanced right hip osteoarthritis.    Mild degenerative changes of the right knee.   No evidence of acute fracture right hip, knee, or femur.       Advanced osteoarthritis right hip with mild degenerative changes of the knee. No acute findings.   Signed by: Sam Grande 1/6/2025 6:34 PM Dictation workstation:   [id number]    XR femur right 2+ views    Result Date: 1/6/2025  Interpreted By:  Sam Grande, STUDY: XR PELVIS 1-2 VIEWS; XR KNEE RIGHT 4+ VIEWS; XR FEMUR RIGHT 2+ VIEWS   INDICATION: Signs/Symptoms:LOC on toilet; Signs/Symptoms:TTP.   COMPARISON: None   ACCESSION NUMBER(S): [id number]; [id number]; [id number]   ORDERING CLINICIAN: IZZY DOWELL   FINDINGS: Advanced right hip osteoarthritis.   Mild degenerative changes of the right knee.   No evidence of acute fracture right hip, knee, or femur.       Advanced osteoarthritis right hip with mild degenerative changes of the knee. No acute findings.   Signed by: Sam Grande 1/6/2025 6:34 PM Dictation workstation:   [id number]    XR chest 1 view    Result Date: 1/6/2025  Interpreted By:  Sam Grande, STUDY: XR CHEST 1 VIEW   INDICATION: Signs/Symptoms:Hypotension, syncope.   COMPARISON: February 21, 2024   ACCESSION NUMBER(S): [id number]   ORDERING CLINICIAN: IZZY DOWELL   FINDINGS: No consolidation, effusion, edema, or pneumothorax. Heart size upper limits of normal unchanged.       No evidence of acute intrathoracic abnormality.   Signed by: Sam Grande 1/6/2025 6:33 PM Dictation workstation:   [id number]    CT cervical spine wo IV contrast    Result Date: 1/6/2025  Interpreted By:  Blanche Templeton, STUDY: CT CERVICAL SPINE WO IV CONTRAST;  1/6/2025 4:20 pm   INDICATION: Signs/Symptoms:syncope, loc.   COMPARISON: None.   ACCESSION NUMBER(S): [id number]   ORDERING CLINICIAN: IZZY DOWELL   TECHNIQUE: Axial noncontrast images of the cervical spine with coronal and sagittal reconstructed images.   FINDINGS: ALIGNMENT: Grade 1 anterolisthesis of C3 on 4, C4 on 5 and C5 on 6.  VERTEBRAE: No acute fracture. Generalized diffuse osteopenia. There is loss of disc height and anterior osteophyte formation at multiple levels, worse at C5-6 and C6-7. Facet and uncovertebral hypertrophic changes causes varying degrees of bilateral neural foraminal narrowing worse at C3-4, C5-6 and C6-7. Degenerative changes at the atlantodental interval. SPINAL CANAL: No critical spinal canal stenosis. Disc spur complex at C5-6 and C6-7 causes mild canal narrowing. PREVERTEBRAL SOFT TISSUES: No prevertebral soft tissue swelling. LUNG APICES: Imaged portion of the lung apices are within normal limits. Calcified granulomas noted in the right lung apex.   OTHER FINDINGS: Atherosclerotic calcification of the aorta and arch vessels.       No acute fracture or traumatic subluxation of the cervical spine.   Multilevel discogenic degenerative changes as described above.   Generalized diffuse osteopenia.   MACRO: None   Signed by: Blanche Templeton 1/6/2025 5:26 PM Dictation workstation:   [id number]    CT head wo IV contrast    Result Date: 1/6/2025  Interpreted By:  Blanche Templeton, STUDY: CT HEAD WO IV CONTRAST;  1/6/2025 4:20 pm   INDICATION: Signs/Symptoms:syncope, LOC.   COMPARISON: None.   ACCESSION NUMBER(S): [id number]   ORDERING CLINICIAN: IZZY DOWELL   TECHNIQUE: Axial noncontrast CT images of the head.   FINDINGS: BRAIN PARENCHYMA: Focal hypodensity in the left thalamus likely sequela of remote lacunar infarct. Gray-white matter interfaces are preserved. No mass, mass effect or midline shift. Moderate deep and periventricular white matter hypodensities are nonspecific, but favored to represent chronic small vessel ischemic changes.   HEMORRHAGE: No acute intracranial hemorrhage. VENTRICLES and EXTRA-AXIAL SPACES: Moderate volume loss with prominence of the ventricles and sulci. EXTRACRANIAL SOFT TISSUES: There is soft tissue swelling and hematoma right frontal scalp. Soft tissue swelling overlying the right  maxilla and zygoma. PARANASAL SINUSES/MASTOIDS: The visualized paranasal sinuses and mastoid air cells are aerated. CALVARIUM: No depressed skull fracture. No destructive osseous lesion.   OTHER FINDINGS: None.       No acute intracranial abnormality.   Soft tissue swelling and hematoma right frontal scalp. Soft tissue swelling extends to the right zygoma and maxilla.   Chronic changes as noted above.   MACRO: None   Signed by: Blanche Templeton 1/6/2025 5:20 PM Dictation workstation:   [id number]    ECG 12 lead    Result Date: 1/6/2025  Undetermined rhythm Incomplete right bundle branch block Left anterior fascicular block Left ventricular hypertrophy ( R in aVL , McDonald product , Romhilt-Whitney ) Abnormal ECG When compared with ECG of 21-FEB-2024 14:48, Current undetermined rhythm precludes rhythm comparison, needs review ST less depressed in Inferior leads Nonspecific T wave abnormality has replaced inverted T waves in Inferior leads      Assessment/Plan   Syncopal episode occurring after large BM likely vasovagal  Plan:  Telemetry  Cardiology consultation  Blood pressure seems fine now  Continue home meds    Colitis  Plan:  Zosyn    Right lower lobe pneumonia  Plan:  Vanco and Zosyn will add Zithromax  Urine antigens for strep pneumo and Legionella    Chronic atrial fibrillation  Plan:  Continue apixaban 5 mg twice daily she is a candidate for 2.5 mg twice daily as she is over 82 weighs less than 60 kg and your creatinine is 1.6  Continue metoprolol XL 25 mg orally daily  Telemetry    Hypertension  Metoprolol XL 25 mg orally daily  Lisinopril 10 mg orally daily    CKD 3  Trend creatinine    Type 2 diabetes mellitus  Diet controlled  Will check glucose with daily lab    DVT prophylaxis  Covered with apixaban  SCDs    I spent 60 minutes in the professional and overall care of this patient.      Everardo Díaz DO

## 2025-01-07 NOTE — PROGRESS NOTES
Pharmacy Medication History     Source of Information: MEDICATION LIST    Additional concerns with the patient's PTA list.     The following updates were made to the Prior to Admission medication list:     Medications ADDED:   MEMANTINE 10 MG  Medications CHANGED:  N/A  Medications REMOVED:   N/A  Medications NOT TAKING:   N/A    Allergy reviewed : Yes    Meds 2 Beds : No    Outpatient pharmacy confirmed and updated in chart : Yes    Pharmacy name: [facility]    The list below reflectives the updated PTA list. Please review each medication in order reconciliation for additional clarification and justification.    Prior to Admission Medications   Prescriptions Last Dose Informant     QUEtiapine (SEROquel) 25 mg tablet       Sig: Take 1 tablet (25 mg) by mouth 2 times a day.   acetaminophen (Tylenol) 500 mg tablet       Sig: Take 2 tablets (1,000 mg) by mouth 2 times a day as needed for mild pain (1 - 3).   apixaban (Eliquis) 5 mg tablet       Sig: Take 1 tablet (5 mg) by mouth 2 times a day.   cholecalciferol (Vitamin D-3) 25 MCG (1000 UT) tablet       Sig: Take 1 tablet (1,000 Units) by mouth once daily.   cyanocobalamin (Vitamin B-12) 500 mcg tablet       Sig: Take 1 tablet (500 mcg) by mouth once daily.   divalproex sprinkle (Depakote Sprinkle) 125 mg DR capsule       Sig: Take 1 capsule (125 mg) by mouth 2 times a day.   lisinopril 10 mg tablet       Sig: Take 1 tablet (10 mg) by mouth once daily.   memantine (Namenda) 10 mg tablet       Sig: Take 1 tablet (10 mg) by mouth every 12 hours.   metoprolol tartrate (Lopressor) 25 mg tablet       Sig: Take 1 tablet (25 mg) by mouth once daily.   multivitamin-children's (Cerovite, Jr) chewable tablet       Sig: Chew 1 tablet once daily.   pantoprazole (ProtoNix) 40 mg EC tablet       Sig: Take 1 tablet (40 mg) by mouth every 12 hours. Do not crush, chew, or split.   psyllium (Metamucil Fiber Singles) 3.4 gram packet       Sig: Take 1 packet by mouth once  daily. Mix with water as directed before drinking   sertraline (Zoloft) 50 mg tablet       Sig: Take 1 tablet (50 mg) by mouth once daily.      Facility-Administered Medications: None       The list below reflectives the updated allergy list. Please review each documented allergy for additional clarification and justification.    Allergies   Allergen Reactions    Aricept [Donepezil] Unknown    Ativan [Lorazepam] Unknown    Ciprofloxacin Unknown    Codeine Unknown    Propoxyphene Unknown          01/07/25 at 7:50 AM - Maria De Jesus Wang

## 2025-01-07 NOTE — PROGRESS NOTES
Transitional Care Coordination Progress Note:  Plan per Medical/Surgical team: treatment of syncope & sepsis with IV ATB & cardio consult   Status: Inpatient  Payor source: [employer] dual  Discharge disposition: [facility] memory unit  Potential Barriers: renal 34/1.20  ADOD: 1/9/2025  JHON Michael RN, BSN Transitional Care Coordinator ED# [phone]      01/07/25 0832   Discharge Planning   Living Arrangements Alone   Support Systems Other (Comment)   Assistance Needed cardio work up, ATB plan   Type of Residence Nursing home/residential care   Do you have animals or pets at home? No   Home or Post Acute Services Post acute facilities (Rehab/SNF/etc)   Type of Post Acute Facility Services Long term care   Expected Discharge Disposition Inter   Does the patient need discharge transport arranged? Yes   RoundTrip coordination needed? Yes   Has discharge transport been arranged? No   Financial Resource Strain   How hard is it for you to pay for the very basics like food, housing, medical care, and heating? Pt Unable   Housing Stability   In the last 12 months, was there a time when you were not able to pay the mortgage or rent on time? Pt Unable   In the past 12 months, how many times have you moved where you were living? 1   At any time in the past 12 months, were you homeless or living in a shelter (including now)? Pt Unable   Transportation Needs   In the past 12 months, has lack of transportation kept you from medical appointments or from getting medications? Pt Unable   In the past 12 months, has lack of transportation kept you from meetings, work, or from getting things needed for daily living? Pt Unable   Patient Choice   Provider Choice list and CMS website (https://medicare.gov/care-compare#search) for post-acute Quality and Resource Measure Data were provided and reviewed with: Family   Patient / Family choosing to utilize agency / facility established prior to hospitalization Yes   Stroke  Family Assessment   Stroke Family Assessment Needed No   Intensity of Service   Intensity of Service 0-30 min

## 2025-01-07 NOTE — PROGRESS NOTES
01/07/25 0832   [facility] Disability Status   Are you deaf or do you have serious difficulty hearing? N   Are you blind or do you have serious difficulty seeing, even when wearing glasses? N   Because of a physical, mental, or emotional condition, do you have serious difficulty concentrating, remembering, or making decisions? (5 years old or older) Y   Do you have serious difficulty walking or climbing stairs? Y   Do you have serious difficulty dressing or bathing? Y   Because of a physical, mental, or emotional condition, do you have serious difficulty doing errands alone such as visiting the doctor? Y

## 2025-01-07 NOTE — PROGRESS NOTES
Emergency Medicine Transition of Care Note.    I received Arilele Alcaraz in signout from Dr. Patrick.  Please see the previous ED provider note for all HPI, PE and MDM up to the time of signout. This is in addition to the primary record.    In brief Arielle Alcaraz is an 89 y.o. female presenting for No chief complaint on file.    At the time of signout we were awaiting: CT    ED Course as of 01/06/25 2325   Mon Jan 06, 2025   1633 BLOOD GAS VENOUS FULL PANEL(!!)  Venous full panel significant for respiratory acidosis and elevated lactate. [MH]   1647 B-type natriuretic peptide  BNP normal at 77. [MH]   1709 Troponin Series, (0, 1 HR)(!)  Low clinical concern for ACS with mildly elevated troponin no ischemic findings on EKG. [MH]   1710 Comprehensive metabolic panel(!)  Metabolic panel sent for RD and elevated BUN of 42. [MH]   1710 Magnesium(!)  Magnesium notably low at 1.59.  IV magnesium ordered. [MH]   1710 TSH with reflex to Free T4 if abnormal(!)  TSH elevated at 6.23. [MH]   1723 CT head wo IV contrast  IMPRESSION:  No acute intracranial abnormality.      Soft tissue swelling and hematoma right frontal scalp. Soft tissue  swelling extends to the right zygoma and maxilla.      Chronic changes as noted above.   [MH]   1732 CT cervical spine wo IV contrast  IMPRESSION:  No acute fracture or traumatic subluxation of the cervical spine.      Multilevel discogenic degenerative changes as described above.      Generalized diffuse osteopenia.   [MH]   1816 Spoke with nurse at [facility] (Ceci Draper), who noted patient passed on toilet. Sent to [place] 2 days ago with negative CT head and face imaging per nursing facility.  [MH]   1826 Thyroxine, Free  Free T4 normal [MH]   1845 FINDINGS:  Advanced right hip osteoarthritis.      Mild degenerative changes of the right knee.      No evidence of acute fracture right hip, knee, or femur.      IMPRESSION:  Advanced osteoarthritis right hip with mild degenerative changes  of  the knee. No acute findings. []   1846 XR chest 1 view  IMPRESSION:  No evidence of acute intrathoracic abnormality.   []      ED Course User Index  [] Yury Trinidad MD         Diagnoses as of 01/06/25 2325   Sepsis, due to unspecified organism, unspecified whether acute organ dysfunction present (Multi)   Colitis   Pneumonia due to infectious organism, unspecified laterality, unspecified part of lung       Medical Decision Making  CT shows possible pneumonia as well as colitis.  These could be a cause of patient's possible sepsis.    Final diagnoses:   [A41.9] Sepsis, due to unspecified organism, unspecified whether acute organ dysfunction present (Multi)   [K52.9] Colitis   [J18.9] Pneumonia due to infectious organism, unspecified laterality, unspecified part of lung           Procedure  Procedures    Parker Herrera MD

## 2025-01-07 NOTE — CONSULTS
Inpatient consult to Cardiology  Consult performed by: Noé Valencia MD  Consult ordered by: Everardo Díaz DO        History Of Present Illness:    Arielle Alcaraz is a 89 y.o. female presenting with a past medical history of A-fib on Eliquis, hypothyroidism, gout, and T2DM presents to the ED for syncope    Data obtained from the clinical chart.  At the time of my evaluation, the patient is disoriented, does not recall the episode that brought her to the hospital, does not remember her age, believes she lives with her parents, and thinks a dog caused injuries to her face. She does not report pain or discomfort. The monitor shows a regular rhythm that appears sinus. Telemetry reveals no pauses or arrhythmias.  According to the clinical chart:  An 89-year-old female with a past medical history of atrial fibrillation on Eliquis, hypothyroidism, gout, and type 2 diabetes mellitus presented to the emergency department for syncope. Per EMS, the patient was found on the toilet and experienced a syncopal episode after a bowel movement. It was noted that the patient had a large bowel movement and does not remember the event.    Initial ED course revealed mildly elevated troponin without ischemic findings on ECG, low magnesium (1.59 mg/dL) corrected with IV magnesium, and elevated TSH (6.23) with normal Free T4. BNP was normal at 77. Imaging studies, including CT head and cervical spine, showed no acute intracranial or cervical abnormalities but revealed soft tissue swelling in the right frontal scalp and multilevel degenerative changes. XR chest demonstrated no acute intrathoracic findings.   RD with elevated BUN (42).    CT Chest Abdomen Pelvis showed> HEART: Normal size. No pericardial effusion. Aortic root  and coronary artery calcifications noted.  There focal airspace opacity in the right lower lobe concerning for developing airspace disease.     Last Recorded Vitals:  Vitals:    01/07/25 0500 01/07/25 0600  "01/07/25 0700 01/07/25 0900   BP: 136/63 (!) 152/110 155/60 148/75   BP Location:    Right arm   Patient Position:    Lying   Pulse: 61 78 58 59   Resp: 18 17 17 16   Temp:   37.1 °C (98.7 °F)    TempSrc:   Temporal    SpO2: 97% 97% 97% 97%       Last Labs:  CBC - 1/7/2025:  4:28 AM  10.0 9.7 119    29.5      CMP - 1/7/2025:  4:28 AM  8.3 6.6 21 --- 0.5   _ 3.7 10 88      PTT - No results in last year.  _   _ _     Troponin I, High Sensitivity   Date/Time Value Ref Range Status   01/06/2025 06:03 PM 18 (H) 0 - 13 ng/L Final   01/06/2025 03:59 PM 26 (H) 0 - 13 ng/L Final   02/22/2024 05:50 AM 16 (H) 0 - 13 ng/L Final     BNP   Date/Time Value Ref Range Status   01/06/2025 03:59 PM 77 0 - 99 pg/mL Final     Hemoglobin A1C   Date/Time Value Ref Range Status   02/20/2024 04:31 AM 5.1 see below % Final      Last I/O:  I/O last 3 completed shifts:  In: 1300 [I.V.:50; IV Piggyback:1250]  Out: -     Past Cardiology Tests (Last 3 Years):  EKG:  ECG 12 lead 01/06/2025 (Preliminary)      ECG 12 Lead 02/21/2024      ECG 12 lead 02/19/2024    Echo:  No results found for this or any previous visit from the past 1095 days.    Ejection Fractions:  No results found for: \"EF\"  Cath:  No results found for this or any previous visit from the past 1095 days.    Stress Test:  No results found for this or any previous visit from the past 1095 days.    Cardiac Imaging:  No results found for this or any previous visit from the past 1095 days.      Past Medical History:  She has no past medical history on file.    Past Surgical History:  She has no past surgical history on file.      Social History:  She reports that she has never smoked. She has never been exposed to tobacco smoke. She has never used smokeless tobacco. She reports that she does not drink alcohol and does not use drugs.    Family History:  No family history on file.     Allergies:  Aricept [donepezil], Ativan [lorazepam], Ciprofloxacin, Codeine, and Propoxyphene    Inpatient " Medications:  Scheduled medications   Medication Dose Route Frequency    apixaban  2.5 mg oral BID    cholecalciferol  1,000 Units oral Daily    cyanocobalamin  500 mcg oral Daily    divalproex sprinkle  125 mg oral BID    [Held by provider] lisinopril  10 mg oral Daily    metoprolol tartrate  25 mg oral Daily    pantoprazole  40 mg oral Daily before breakfast    Or    pantoprazole  40 mg intravenous Daily before breakfast    QUEtiapine  25 mg oral BID    sertraline  50 mg oral Daily     PRN medications   Medication    acetaminophen    Or    acetaminophen    Or    acetaminophen    ondansetron    Or    ondansetron     Continuous Medications   Medication Dose Last Rate     Outpatient Medications:  Current Outpatient Medications   Medication Instructions    acetaminophen (TYLENOL) 1,000 mg, oral, 2 times daily PRN    apixaban (ELIQUIS) 5 mg, oral, 2 times daily    cholecalciferol (VITAMIN D-3) 1,000 Units, oral, Daily    cyanocobalamin (VITAMIN B-12) 500 mcg, oral, Daily    divalproex sprinkle (DEPAKOTE SPRINKLE) 125 mg, oral, 2 times daily    lisinopril 10 mg, oral, Daily    memantine (NAMENDA) 10 mg, Every 12 hours    metoprolol tartrate (LOPRESSOR) 25 mg, oral, Daily    multivitamin-children's (Cerovite, Jr) chewable tablet 1 tablet, oral, Daily    pantoprazole (PROTONIX) 40 mg, oral, Every 12 hours, Do not crush, chew, or split.    psyllium (Metamucil Fiber Singles) 3.4 gram packet Take 1 packet by mouth once daily. Mix with water as directed before drinking    QUEtiapine (SEROQUEL) 25 mg, oral, 2 times daily    sertraline (ZOLOFT) 50 mg, oral, Daily       Physical Exam:  General:  Patient is awake, alert, and disoriented.  Patient is in no acute distress.  HEENT:  Normocephalic.  Moist mucosa.    Ecchymosis on the face, involving the forehead and right periorbital region.  Neck:  Normal Jugular Venous Pressure.  Cardiovascular:  Regular rate and rhythm.  Normal S1 and S2, soft systolic murmur  Pulmonary:  Clear to  auscultation bilaterally.  Abdomen:  Soft. Non-tender.   Non-distended.  Positive bowel sounds.  Lower Extremities:  pedal pulses normal. No edema  Neurologic:  Alert and oriented x3. No focal deficit.   Skin: Skin warm and dry, normal skin turgor.   Psychiatric: Normal affect.    Assessment/Plan   Arielle Alcaraz is a 89 y.o. female presenting with a past medical history of A-fib on Eliquis, hypothyroidism, gout, and T2DM presents to the ED for syncope  This is an 89-year-old female with a history of atrial fibrillation (on Eliquis), hypothyroidism, gout, and type 2 diabetes mellitus, presenting with a syncopal episode. Per EMS, the patient experienced syncope while on the toilet following a large bowel movement. On evaluation, the patient is disoriented, with no recall of the event, her age, or other contextual details. Physical exam noted soft tissue swelling and ecchymosis on the right frontal and periorbital region. She denies pain or discomfort.    POCUS> Normal EF. No arotic stenosis.    Relevant Test Results:  Laboratory Studies:  Elevated TSH (6.23) with normal Free T4.  Low magnesium (1.59 mg/dL), corrected with IV magnesium.  RD with elevated BUN (42 mg/dL) and creatinine (1.20 mg/dL), eGFR 44.  Mildly elevated troponin, without ischemic findings on ECG.  Normal BNP (77).  Respiratory acidosis and elevated lactate on venous blood gas.  Anemia with hemoglobin of 9.7 g/dL and hematocrit of 29.5%.  Imaging:  CT Head/Cervical Spine: No acute intracranial abnormalities or cervical fractures; multilevel degenerative changes.  Chest X-ray: No acute intrathoracic findings.  CT Chest/Abdomen/Pelvis: Normal-sized heart with aortic root and coronary calcifications. Developing airspace disease in the right lower lobe.    Initial Assessment/Differential Diagnoses:  Syncope: Likely multifactorial, with contributions from situational factors (likely vasovagal due to bowel movement), anemia, and possible hypovolemia  secondary to dehydration (elevated BUN/creatinine ratio).  Atrial fibrillation: Now on SR at telemetry. Anticoagulation on Eliquis. No current arrhythmias or pauses noted on telemetry.  Minimal troponin elevation: Elevation with a flat trend consistent with a non-MI troponin elevation / chronic non-traumatic myocardial injury in the setting of above. Core measures do not apply  Possible developing pneumonia: Focal airspace opacity in the right lower lobe.  Anemia: Needs further evaluation for etiology (chronic disease, occult blood loss, nutritional deficiency).    Assessment  Based on the evaluation and clinical chart review, the most likely etiology of the syncope is related to defecation due to a vasovagal response. The minimal elevation of TSH with normal T4 does not appear to be a contributing factor. The patient has a normal BNP, and a POCUS showed normal EF without evidence of aortic stenosis. Atrial fibrillation is likely resolved based on monitor findings, but an ECG is requested for confirmation. The mild elevation in BUN/creatinine suggests possible mild dehydration, and orthostatic tests are recommended after rehydration to evaluate this as a contributing factor. The patient is on chronic anticoagulation and has chronic anemia, seemingly stable with hemoglobin around 10; iron supplementation needs to be assessed.    In conclusion, from a cardiologic standpoint, the syncope is most likely vasovagal in context. I suggest maintaining the current beta-blocker dosage and continuing Eliquis for paroxysmal atrial fibrillation. Complete the workup with orthostatic testing after rehydration. Perform an ECG now to confirm conversion to sinus rhythm. An ambulatory Holter monitor is recommended to evaluate for arrhythmias or pauses, with follow-up based on Holter results.    Plan and Recommendations:  -Monitor for arrhythmias with continued telemetry while in house  -Maintain the current beta-blocker dosage and  continuing Eliquis for paroxysmal atrial fibrillation  -Complete the workup with orthostatic testing after rehydration.   -Perform an ECG now to confirm conversion to sinus rhythm.   -Need for iron supplementation needs to be assessed  -Ambulatory Holter monitor    Code Status:  Full Code    Noé Valencia MD

## 2025-01-07 NOTE — CARE PLAN
Problem: Skin  Goal: Decreased wound size/increased tissue granulation at next dressing change  Outcome: Progressing  Goal: Participates in plan/prevention/treatment measures  Outcome: Progressing  Goal: Prevent/manage excess moisture  Outcome: Progressing  Goal: Prevent/minimize sheer/friction injuries  Outcome: Progressing  Goal: Promote/optimize nutrition  Outcome: Progressing  Goal: Promote skin healing  Outcome: Progressing     Problem: Pain - Adult  Goal: Verbalizes/displays adequate comfort level or baseline comfort level  Outcome: Progressing     Problem: Safety - Adult  Goal: Free from fall injury  Outcome: Progressing     Problem: Discharge Planning  Goal: Discharge to home or other facility with appropriate resources  Outcome: Progressing     Problem: Chronic Conditions and Co-morbidities  Goal: Patient's chronic conditions and co-morbidity symptoms are monitored and maintained or improved  Outcome: Progressing   The patient's goals for the shift include      The clinical goals for the shift include Pt will charan;in safe and not fall during shift    Over the shift, the patient did not make progress toward the following goals. Barriers to progression include . Recommendations to address these barriers include .

## 2025-01-08 ENCOUNTER — APPOINTMENT (OUTPATIENT)
Dept: CARDIOLOGY | Facility: HOSPITAL | Age: 89
End: 2025-01-08
Payer: COMMERCIAL

## 2025-01-08 VITALS
BODY MASS INDEX: 24.32 KG/M2 | HEART RATE: 64 BPM | DIASTOLIC BLOOD PRESSURE: 64 MMHG | RESPIRATION RATE: 18 BRPM | TEMPERATURE: 98.6 F | HEIGHT: 60 IN | SYSTOLIC BLOOD PRESSURE: 141 MMHG | OXYGEN SATURATION: 99 % | WEIGHT: 123.9 LBS

## 2025-01-08 LAB
ANION GAP SERPL CALC-SCNC: 7 MMOL/L (ref 10–20)
BACTERIA UR CULT: NORMAL
BASOPHILS # BLD AUTO: 0.04 X10*3/UL (ref 0–0.1)
BASOPHILS NFR BLD AUTO: 0.4 %
BODY SURFACE AREA: 1.54 M2
BUN SERPL-MCNC: 23 MG/DL (ref 6–23)
CALCIUM SERPL-MCNC: 8.5 MG/DL (ref 8.6–10.3)
CHLORIDE SERPL-SCNC: 112 MMOL/L (ref 98–107)
CO2 SERPL-SCNC: 26 MMOL/L (ref 21–32)
CREAT SERPL-MCNC: 0.85 MG/DL (ref 0.5–1.05)
EGFRCR SERPLBLD CKD-EPI 2021: 66 ML/MIN/1.73M*2
EOSINOPHIL # BLD AUTO: 0.12 X10*3/UL (ref 0–0.4)
EOSINOPHIL NFR BLD AUTO: 1.1 %
ERYTHROCYTE [DISTWIDTH] IN BLOOD BY AUTOMATED COUNT: 12.3 % (ref 11.5–14.5)
GLUCOSE SERPL-MCNC: 89 MG/DL (ref 74–99)
HCT VFR BLD AUTO: 30.4 % (ref 36–46)
HGB BLD-MCNC: 9.8 G/DL (ref 12–16)
HOLD SPECIMEN: NORMAL
IMM GRANULOCYTES # BLD AUTO: 0.04 X10*3/UL (ref 0–0.5)
IMM GRANULOCYTES NFR BLD AUTO: 0.4 % (ref 0–0.9)
LEGIONELLA AG UR QL: NEGATIVE
LYMPHOCYTES # BLD AUTO: 1.77 X10*3/UL (ref 0.8–3)
LYMPHOCYTES NFR BLD AUTO: 15.7 %
MCH RBC QN AUTO: 29.5 PG (ref 26–34)
MCHC RBC AUTO-ENTMCNC: 32.2 G/DL (ref 32–36)
MCV RBC AUTO: 92 FL (ref 80–100)
MONOCYTES # BLD AUTO: 0.78 X10*3/UL (ref 0.05–0.8)
MONOCYTES NFR BLD AUTO: 6.9 %
NEUTROPHILS # BLD AUTO: 8.51 X10*3/UL (ref 1.6–5.5)
NEUTROPHILS NFR BLD AUTO: 75.5 %
NRBC BLD-RTO: 0 /100 WBCS (ref 0–0)
PLATELET # BLD AUTO: 112 X10*3/UL (ref 150–450)
POTASSIUM SERPL-SCNC: 4 MMOL/L (ref 3.5–5.3)
RBC # BLD AUTO: 3.32 X10*6/UL (ref 4–5.2)
S PNEUM AG UR QL: POSITIVE
SODIUM SERPL-SCNC: 141 MMOL/L (ref 136–145)
WBC # BLD AUTO: 11.3 X10*3/UL (ref 4.4–11.3)

## 2025-01-08 PROCEDURE — 2500000004 HC RX 250 GENERAL PHARMACY W/ HCPCS (ALT 636 FOR OP/ED): Performed by: INTERNAL MEDICINE

## 2025-01-08 PROCEDURE — 93242 EXT ECG>48HR<7D RECORDING: CPT

## 2025-01-08 PROCEDURE — 85025 COMPLETE CBC W/AUTO DIFF WBC: CPT | Performed by: INTERNAL MEDICINE

## 2025-01-08 PROCEDURE — 2500000002 HC RX 250 W HCPCS SELF ADMINISTERED DRUGS (ALT 637 FOR MEDICARE OP, ALT 636 FOR OP/ED): Performed by: INTERNAL MEDICINE

## 2025-01-08 PROCEDURE — 36415 COLL VENOUS BLD VENIPUNCTURE: CPT | Performed by: INTERNAL MEDICINE

## 2025-01-08 PROCEDURE — 80048 BASIC METABOLIC PNL TOTAL CA: CPT | Performed by: INTERNAL MEDICINE

## 2025-01-08 PROCEDURE — 99239 HOSP IP/OBS DSCHRG MGMT >30: CPT | Performed by: INTERNAL MEDICINE

## 2025-01-08 PROCEDURE — 2500000001 HC RX 250 WO HCPCS SELF ADMINISTERED DRUGS (ALT 637 FOR MEDICARE OP): Performed by: INTERNAL MEDICINE

## 2025-01-08 RX ORDER — CEFDINIR 300 MG/1
300 CAPSULE ORAL 2 TIMES DAILY
Qty: 6 CAPSULE | Refills: 0 | Status: SHIPPED | OUTPATIENT
Start: 2025-01-08 | End: 2025-01-11

## 2025-01-08 RX ADMIN — CYANOCOBALAMIN TAB 500 MCG 500 MCG: 500 TAB at 08:35

## 2025-01-08 RX ADMIN — Medication 1000 UNITS: at 08:35

## 2025-01-08 RX ADMIN — ACETAMINOPHEN 650 MG: 325 TABLET, FILM COATED ORAL at 12:00

## 2025-01-08 RX ADMIN — APIXABAN 2.5 MG: 5 TABLET, FILM COATED ORAL at 08:35

## 2025-01-08 RX ADMIN — PANTOPRAZOLE SODIUM 40 MG: 40 TABLET, DELAYED RELEASE ORAL at 06:09

## 2025-01-08 RX ADMIN — QUETIAPINE FUMARATE 25 MG: 25 TABLET ORAL at 08:36

## 2025-01-08 RX ADMIN — SERTRALINE 50 MG: 50 TABLET, FILM COATED ORAL at 08:35

## 2025-01-08 RX ADMIN — DIVALPROEX SODIUM 125 MG: 125 CAPSULE ORAL at 08:36

## 2025-01-08 RX ADMIN — CEFTRIAXONE SODIUM 1 G: 1 INJECTION, SOLUTION INTRAVENOUS at 10:42

## 2025-01-08 RX ADMIN — METOPROLOL TARTRATE 25 MG: 25 TABLET, FILM COATED ORAL at 08:35

## 2025-01-08 ASSESSMENT — PAIN SCALES - GENERAL
PAINLEVEL_OUTOF10: 3
PAINLEVEL_OUTOF10: 0 - NO PAIN

## 2025-01-08 ASSESSMENT — PAIN DESCRIPTION - LOCATION: LOCATION: KNEE

## 2025-01-08 ASSESSMENT — PAIN DESCRIPTION - ORIENTATION: ORIENTATION: RIGHT

## 2025-01-08 NOTE — PROGRESS NOTES
Vancomycin Dosing by Pharmacy- INITIAL    Arielle Alcaraz is a 89 y.o. year old female who Pharmacy has been consulted for vancomycin dosing for other Sepsis . Based on the patient's indication and renal status this patient will be dosed based on a goal trough/random level of 15-20.     Renal function is currently improving. Pt is in RD, dosing by level.    Visit Vitals  /76 (BP Location: Right arm, Patient Position: Lying)   Pulse 67   Temp 36.9 °C (98.4 °F) (Temporal)   Resp 18        Lab Results   Component Value Date    CREATININE 1.20 (H) 2025    CREATININE 1.64 (H) 2025    CREATININE 0.87 2024    CREATININE 1.02 2024        Patient weight is as follows:   Vitals:    25 1434   Weight: 56.2 kg (123 lb 14.4 oz)       Cultures:  No results found for the encounter in last 14 days.        I/O last 3 completed shifts:  In: 1350 (24 mL/kg) [I.V.:50 (0.9 mL/kg); IV Piggyback:1300]  Out: 200 (3.6 mL/kg) [Urine:200 (0.1 mL/kg/hr)]  Weight: 56.2 kg   I/O during current shift:  No intake/output data recorded.    Temp (24hrs), Av.1 °C (98.8 °F), Min:36.9 °C (98.4 °F), Max:37.3 °C (99.2 °F)         Assessment/Plan     Patient has already been given a loading dose of 1000 mg, yesterday  @. Since pt had dose yesterday, obtained level today 7.9 @1905.  Will initiate vancomycin maintenance, a one time dose of 1250 mg.  Follow-up level will be ordered on  at 1800 unless clinically indicated sooner.  Will continue to monitor renal function daily while on vancomycin and order serum creatinine at least every 48 hours if not already ordered.  Follow for continued vancomycin needs, clinical response, and signs/symptoms of toxicity.       Mark Escudero, PharmD

## 2025-01-08 NOTE — CARE PLAN
The patient's goals for the shift include      The clinical goals for the shift include Pt will charan;in safe and not fall during shift

## 2025-01-08 NOTE — DISCHARGE SUMMARY
Discharge Diagnosis  1. Vasovagal syncope  2. RD, resolved  3. Dementia  4. UTI/RLL PNA      Issues Requiring Follow-Up  pcp    Discharge Meds     Medication List      START taking these medications     cefdinir 300 mg capsule; Commonly known as: Omnicef; Take 1 capsule (300   mg) by mouth 2 times a day for 3 days.     CONTINUE taking these medications     acetaminophen 500 mg tablet; Commonly known as: Tylenol   apixaban 5 mg tablet; Commonly known as: Eliquis   cholecalciferol 25 MCG (1000 UT) tablet; Commonly known as: Vitamin D-3   cyanocobalamin 500 mcg tablet; Commonly known as: Vitamin B-12   divalproex sprinkle 125 mg DR capsule; Commonly known as: Depakote   Sprinkle   lisinopril 10 mg tablet   memantine 10 mg tablet; Commonly known as: Namenda   Metamucil Fiber Singles 3.4 gram packet; Generic drug: psyllium; Take 1   packet by mouth once daily. Mix with water as directed before drinking   metoprolol tartrate 25 mg tablet; Commonly known as: Lopressor   multivitamin with iron - children's chewable tablet; Commonly known as:   Cerovite, Jr   pantoprazole 40 mg EC tablet; Commonly known as: ProtoNix; Take 1 tablet   (40 mg) by mouth every 12 hours. Do not crush, chew, or split.   QUEtiapine 25 mg tablet; Commonly known as: SEROquel   sertraline 50 mg tablet; Commonly known as: Zoloft       Test Results Pending At Discharge  Pending Labs       Order Current Status    Extra Urine Gray Tube Collected (01/07/25 0234)    Blood Gas Lactic Acid, Venous In process    Legionella Antigen, Urine In process    Mycoplasma pneumoniae antibody, IgM In process    Streptococcus pneumoniae Antigen, Urine In process    Urinalysis with Reflex Culture and Microscopic In process    Blood Culture Preliminary result    Blood Culture Preliminary result            Hospital Course   Patient is a very pleasant 89-year-old female that she presented to the hospital after a fall.  Patient was found to have likely vasovagal syncope as she  was defecating and then passed out.  She is at her baseline alert oriented x 1.  Patient was found to have a UTI and right lower lobe pneumonia for which she was on IV antibiotics at the time of discharge she is being discharged on 2 more days of oral cefdinir to cover for pneumonia she has completed antibiotic therapy with 3 days already for UTI.  Patient has a large amount of ecchymosis around her right eye.  Patient is on anticoagulation with Eliquis for A-fib if she has recurrent falls likely there should be a discussion health and possibly discontinued.    Time spent more than 30 minutes on discharge      Pertinent Physical Exam At Time of Discharge  Physical Exam  Vitals reviewed.   HENT:      Head: Normocephalic.      Right Ear: Tympanic membrane normal.      Nose: Nose normal.      Mouth/Throat:      Mouth: Mucous membranes are moist.   Cardiovascular:      Rate and Rhythm: Rhythm irregular.   Pulmonary:      Effort: Pulmonary effort is normal.   Abdominal:      General: Abdomen is flat. Bowel sounds are normal.      Palpations: Abdomen is soft.   Skin:     Capillary Refill: Capillary refill takes less than 2 seconds.   Neurological:      General: No focal deficit present.      Mental Status: She is alert.         Outpatient Follow-Up  No future appointments.      Elif Farmer MD

## 2025-01-08 NOTE — PROGRESS NOTES
01/08/25 0757   Discharge Planning   Expected Discharge Disposition Inter     Once med ready plan is to discharge back to [facility], per notes in careport there are no barriers for her to return, she can return when med ready, patient has cardio consult will need holter monitor prior to discharge, on po zithro, IV rocephin and vanco, I will continue to monitor for discharge planing.    09:25 Patient will discharge back to [facility], requested transport for 11:30a through the DSC team, facility is aware patient is returning, report # [phone] bedside rn has been updated in regards to about information.

## 2025-01-09 LAB — M PNEUMO IGM SER IA-ACNC: 0.03 U/L

## 2025-01-11 LAB — BACTERIA BLD CULT: NORMAL

## 2025-01-12 LAB
BACTERIA BLD AEROBE CULT: ABNORMAL
BACTERIA BLD CULT: ABNORMAL
GRAM STN SPEC: ABNORMAL

## 2025-01-14 LAB
ATRIAL RATE: 66 BPM
P AXIS: 44 DEGREES
P OFFSET: 150 MS
P ONSET: 117 MS
PR INTERVAL: 188 MS
Q ONSET: 211 MS
QRS COUNT: 10 BEATS
QRS DURATION: 98 MS
QT INTERVAL: 450 MS
QTC CALCULATION(BAZETT): 464 MS
QTC FREDERICIA: 459 MS
R AXIS: -55 DEGREES
T AXIS: 56 DEGREES
T OFFSET: 436 MS
VENTRICULAR RATE: 64 BPM

## 2025-01-16 NOTE — DOCUMENTATION CLARIFICATION NOTE
"    PATIENT:               KAYLEIGH MARTINEZ  ACCT #:                  [id number]  MRN:                       [id number]  :                       1935  ADMIT DATE:       2025 3:16 PM  DISCH DATE:        2025 12:41 PM  RESPONDING PROVIDER #:        [id number]          PROVIDER RESPONSE TEXT:    Sepsis was a differential diagnosis and ruled out after study    CDI QUERY TEXT:    Clarification    Question: Sepsis was documented in the medical record. Based on the documentation and the clinical information, can the diagnosis be further clarified as    When answering this query, please exercise your independent professional judgment. The fact that a question is being asked, does not imply that any particular answer is desired or expected.    The patient's clinical indicators include:  Clinical Information: 88 y/o female admitted -  for syncopal episode and treated for pneumonia.     ED note, Dr. Silver: \"Sepsis, due to unspecified organism, unspecified whether acute organ dysfunction present\"   Progress note, Dr. Farmer: \"admission presenting with Sepsis, due to unspecified organism, unspecified whether acute organ dysfunction present\"   Cardiology note, Dr. Valencia: \"The mild elevation in BUN/creatinine suggests possible mild dehydration\"    Clinical Indicators:  -VS on admit: T 97.3, HR 69, RR 18, /93, 100% RA  -WBC: 12.2, 10.0, 11.3  -Platelets: 130, 119, 112  -Abs Neutrophil: 10.95, 8.51  -Lactic acid: 1.4  -BUN: 42, 34, 23  -Creat: 1.64, 1.20, 0.85  -Bilirubin: 0.5  -Procalcitonin: N/A  - Blood cultures: staphylloccus hominis  - Urine culture: clinically insignificant growth  - Streptococcus pneumoniae, urine: positive  -Other clinical indicators: No linked organ dysfunction    Treatment: IV NS bolus 1000cc x1 on , IV Vanco x2, IV Zosyn x1, IV Ceftriaxone x2, oral Azithromycin x1    Risk Factors: pneumonia  Options provided:  -- Sepsis was a differential diagnosis and ruled " out after study  -- Sepsis with acute organ dysfunction, Please specify sepsis associated organ dysfunction below  -- Other - I will add my own diagnosis  -- Refer to Clinical Documentation Reviewer    Query created by: Carmela Horner on 1/15/2025 3:02 PM      Electronically signed by:  PORFIRIO HORNER MD 1/16/2025 8:38 AM

## 2025-01-31 LAB — BODY SURFACE AREA: 1.54 M2

## 2025-01-31 PROCEDURE — 93244 EXT ECG>48HR<7D REV&INTERPJ: CPT | Performed by: INTERNAL MEDICINE

## 2025-05-01 ENCOUNTER — APPOINTMENT (OUTPATIENT)
Dept: CARDIOLOGY | Facility: HOSPITAL | Age: 89
End: 2025-05-01
Payer: COMMERCIAL

## 2025-05-01 ENCOUNTER — HOSPITAL ENCOUNTER (OUTPATIENT)
Facility: HOSPITAL | Age: 89
Setting detail: OBSERVATION
Discharge: NURSING, REHAB OR LONG-TERM CARE | End: 2025-05-02
Attending: EMERGENCY MEDICINE | Admitting: STUDENT IN AN ORGANIZED HEALTH CARE EDUCATION/TRAINING PROGRAM
Payer: COMMERCIAL

## 2025-05-01 ENCOUNTER — APPOINTMENT (OUTPATIENT)
Dept: RADIOLOGY | Facility: HOSPITAL | Age: 89
End: 2025-05-01
Payer: COMMERCIAL

## 2025-05-01 DIAGNOSIS — E83.42 HYPOMAGNESEMIA: ICD-10-CM

## 2025-05-01 DIAGNOSIS — R55 SYNCOPE, UNSPECIFIED SYNCOPE TYPE: Primary | ICD-10-CM

## 2025-05-01 DIAGNOSIS — N39.0 URINARY TRACT INFECTION WITHOUT HEMATURIA, SITE UNSPECIFIED: ICD-10-CM

## 2025-05-01 DIAGNOSIS — N17.9 ACUTE KIDNEY INJURY: ICD-10-CM

## 2025-05-01 DIAGNOSIS — I48.91 ATRIAL FIBRILLATION, UNSPECIFIED TYPE (MULTI): ICD-10-CM

## 2025-05-01 LAB
ALBUMIN SERPL BCP-MCNC: 3.6 G/DL (ref 3.4–5)
ALP SERPL-CCNC: 81 U/L (ref 33–136)
ALT SERPL W P-5'-P-CCNC: 8 U/L (ref 7–45)
ANION GAP SERPL CALC-SCNC: 11 MMOL/L (ref 10–20)
APPEARANCE UR: CLEAR
AST SERPL W P-5'-P-CCNC: 15 U/L (ref 9–39)
BACTERIA #/AREA URNS AUTO: ABNORMAL /HPF
BASOPHILS # BLD AUTO: 0.03 X10*3/UL (ref 0–0.1)
BASOPHILS NFR BLD AUTO: 0.2 %
BILIRUB SERPL-MCNC: 0.5 MG/DL (ref 0–1.2)
BILIRUB UR STRIP.AUTO-MCNC: NEGATIVE MG/DL
BNP SERPL-MCNC: 121 PG/ML (ref 0–99)
BUN SERPL-MCNC: 38 MG/DL (ref 6–23)
CALCIUM SERPL-MCNC: 9 MG/DL (ref 8.6–10.3)
CARDIAC TROPONIN I PNL SERPL HS: 9 NG/L (ref 0–13)
CARDIAC TROPONIN I PNL SERPL HS: 9 NG/L (ref 0–13)
CHLORIDE SERPL-SCNC: 109 MMOL/L (ref 98–107)
CO2 SERPL-SCNC: 26 MMOL/L (ref 21–32)
COLOR UR: YELLOW
CREAT SERPL-MCNC: 1.24 MG/DL (ref 0.5–1.05)
EGFRCR SERPLBLD CKD-EPI 2021: 42 ML/MIN/1.73M*2
EOSINOPHIL # BLD AUTO: 0.03 X10*3/UL (ref 0–0.4)
EOSINOPHIL NFR BLD AUTO: 0.2 %
ERYTHROCYTE [DISTWIDTH] IN BLOOD BY AUTOMATED COUNT: 12 % (ref 11.5–14.5)
GLUCOSE SERPL-MCNC: 189 MG/DL (ref 74–99)
GLUCOSE UR STRIP.AUTO-MCNC: NORMAL MG/DL
HCT VFR BLD AUTO: 40.4 % (ref 36–46)
HGB BLD-MCNC: 12.6 G/DL (ref 12–16)
IMM GRANULOCYTES # BLD AUTO: 0.04 X10*3/UL (ref 0–0.5)
IMM GRANULOCYTES NFR BLD AUTO: 0.3 % (ref 0–0.9)
KETONES UR STRIP.AUTO-MCNC: NEGATIVE MG/DL
LEUKOCYTE ESTERASE UR QL STRIP.AUTO: ABNORMAL
LYMPHOCYTES # BLD AUTO: 3.24 X10*3/UL (ref 0.8–3)
LYMPHOCYTES NFR BLD AUTO: 26.8 %
MAGNESIUM SERPL-MCNC: 1.36 MG/DL (ref 1.6–2.4)
MCH RBC QN AUTO: 28.1 PG (ref 26–34)
MCHC RBC AUTO-ENTMCNC: 31.2 G/DL (ref 32–36)
MCV RBC AUTO: 90 FL (ref 80–100)
MONOCYTES # BLD AUTO: 0.9 X10*3/UL (ref 0.05–0.8)
MONOCYTES NFR BLD AUTO: 7.4 %
MUCOUS THREADS #/AREA URNS AUTO: ABNORMAL /LPF
NEUTROPHILS # BLD AUTO: 7.86 X10*3/UL (ref 1.6–5.5)
NEUTROPHILS NFR BLD AUTO: 65.1 %
NITRITE UR QL STRIP.AUTO: ABNORMAL
NRBC BLD-RTO: 0 /100 WBCS (ref 0–0)
PH UR STRIP.AUTO: 5 [PH]
PLATELET # BLD AUTO: 157 X10*3/UL (ref 150–450)
POTASSIUM SERPL-SCNC: 4.7 MMOL/L (ref 3.5–5.3)
PROT SERPL-MCNC: 6.1 G/DL (ref 6.4–8.2)
PROT UR STRIP.AUTO-MCNC: NEGATIVE MG/DL
RBC # BLD AUTO: 4.48 X10*6/UL (ref 4–5.2)
RBC # UR STRIP.AUTO: NEGATIVE MG/DL
RBC #/AREA URNS AUTO: ABNORMAL /HPF
SODIUM SERPL-SCNC: 141 MMOL/L (ref 136–145)
SP GR UR STRIP.AUTO: 1.01
SQUAMOUS #/AREA URNS AUTO: ABNORMAL /HPF
UROBILINOGEN UR STRIP.AUTO-MCNC: NORMAL MG/DL
WBC # BLD AUTO: 12.1 X10*3/UL (ref 4.4–11.3)
WBC #/AREA URNS AUTO: ABNORMAL /HPF

## 2025-05-01 PROCEDURE — 73552 X-RAY EXAM OF FEMUR 2/>: CPT | Mod: RT

## 2025-05-01 PROCEDURE — 84484 ASSAY OF TROPONIN QUANT: CPT | Performed by: EMERGENCY MEDICINE

## 2025-05-01 PROCEDURE — 96367 TX/PROPH/DG ADDL SEQ IV INF: CPT | Mod: 59

## 2025-05-01 PROCEDURE — 73502 X-RAY EXAM HIP UNI 2-3 VIEWS: CPT | Mod: RIGHT SIDE | Performed by: RADIOLOGY

## 2025-05-01 PROCEDURE — 99223 1ST HOSP IP/OBS HIGH 75: CPT | Performed by: STUDENT IN AN ORGANIZED HEALTH CARE EDUCATION/TRAINING PROGRAM

## 2025-05-01 PROCEDURE — 87077 CULTURE AEROBIC IDENTIFY: CPT | Mod: AHULAB | Performed by: EMERGENCY MEDICINE

## 2025-05-01 PROCEDURE — 93005 ELECTROCARDIOGRAM TRACING: CPT

## 2025-05-01 PROCEDURE — 36415 COLL VENOUS BLD VENIPUNCTURE: CPT | Performed by: EMERGENCY MEDICINE

## 2025-05-01 PROCEDURE — 73552 X-RAY EXAM OF FEMUR 2/>: CPT | Mod: RIGHT SIDE | Performed by: RADIOLOGY

## 2025-05-01 PROCEDURE — 83880 ASSAY OF NATRIURETIC PEPTIDE: CPT | Performed by: EMERGENCY MEDICINE

## 2025-05-01 PROCEDURE — G0378 HOSPITAL OBSERVATION PER HR: HCPCS

## 2025-05-01 PROCEDURE — 87086 URINE CULTURE/COLONY COUNT: CPT | Mod: AHULAB | Performed by: EMERGENCY MEDICINE

## 2025-05-01 PROCEDURE — 70450 CT HEAD/BRAIN W/O DYE: CPT

## 2025-05-01 PROCEDURE — 2500000002 HC RX 250 W HCPCS SELF ADMINISTERED DRUGS (ALT 637 FOR MEDICARE OP, ALT 636 FOR OP/ED): Performed by: STUDENT IN AN ORGANIZED HEALTH CARE EDUCATION/TRAINING PROGRAM

## 2025-05-01 PROCEDURE — 99285 EMERGENCY DEPT VISIT HI MDM: CPT | Mod: 25 | Performed by: EMERGENCY MEDICINE

## 2025-05-01 PROCEDURE — 73564 X-RAY EXAM KNEE 4 OR MORE: CPT | Mod: RIGHT SIDE | Performed by: RADIOLOGY

## 2025-05-01 PROCEDURE — 83735 ASSAY OF MAGNESIUM: CPT | Performed by: EMERGENCY MEDICINE

## 2025-05-01 PROCEDURE — 81001 URINALYSIS AUTO W/SCOPE: CPT | Performed by: EMERGENCY MEDICINE

## 2025-05-01 PROCEDURE — 73502 X-RAY EXAM HIP UNI 2-3 VIEWS: CPT | Mod: RT

## 2025-05-01 PROCEDURE — 2500000001 HC RX 250 WO HCPCS SELF ADMINISTERED DRUGS (ALT 637 FOR MEDICARE OP): Performed by: STUDENT IN AN ORGANIZED HEALTH CARE EDUCATION/TRAINING PROGRAM

## 2025-05-01 PROCEDURE — 71045 X-RAY EXAM CHEST 1 VIEW: CPT

## 2025-05-01 PROCEDURE — 73564 X-RAY EXAM KNEE 4 OR MORE: CPT | Mod: RT

## 2025-05-01 PROCEDURE — 96365 THER/PROPH/DIAG IV INF INIT: CPT | Mod: 59

## 2025-05-01 PROCEDURE — 71045 X-RAY EXAM CHEST 1 VIEW: CPT | Mod: FOREIGN READ | Performed by: RADIOLOGY

## 2025-05-01 PROCEDURE — 2500000004 HC RX 250 GENERAL PHARMACY W/ HCPCS (ALT 636 FOR OP/ED): Mod: JZ | Performed by: EMERGENCY MEDICINE

## 2025-05-01 PROCEDURE — 70450 CT HEAD/BRAIN W/O DYE: CPT | Performed by: RADIOLOGY

## 2025-05-01 PROCEDURE — 96366 THER/PROPH/DIAG IV INF ADDON: CPT | Mod: 59

## 2025-05-01 PROCEDURE — 80053 COMPREHEN METABOLIC PANEL: CPT | Performed by: EMERGENCY MEDICINE

## 2025-05-01 PROCEDURE — 85025 COMPLETE CBC W/AUTO DIFF WBC: CPT | Performed by: EMERGENCY MEDICINE

## 2025-05-01 RX ORDER — BENZONATATE 100 MG/1
100 CAPSULE ORAL 3 TIMES DAILY PRN
Status: DISCONTINUED | OUTPATIENT
Start: 2025-05-01 | End: 2025-05-03 | Stop reason: HOSPADM

## 2025-05-01 RX ORDER — ONDANSETRON HYDROCHLORIDE 2 MG/ML
4 INJECTION, SOLUTION INTRAVENOUS EVERY 4 HOURS PRN
Status: DISCONTINUED | OUTPATIENT
Start: 2025-05-01 | End: 2025-05-03 | Stop reason: HOSPADM

## 2025-05-01 RX ORDER — DIVALPROEX SODIUM 125 MG/1
125 CAPSULE, COATED PELLETS ORAL 2 TIMES DAILY
Status: DISCONTINUED | OUTPATIENT
Start: 2025-05-01 | End: 2025-05-02

## 2025-05-01 RX ORDER — POLYETHYLENE GLYCOL 3350 17 G/17G
17 POWDER, FOR SOLUTION ORAL DAILY PRN
Status: DISCONTINUED | OUTPATIENT
Start: 2025-05-01 | End: 2025-05-03 | Stop reason: HOSPADM

## 2025-05-01 RX ORDER — CEFTRIAXONE 1 G/50ML
1 INJECTION, SOLUTION INTRAVENOUS ONCE
Status: COMPLETED | OUTPATIENT
Start: 2025-05-01 | End: 2025-05-01

## 2025-05-01 RX ORDER — METOPROLOL TARTRATE 25 MG/1
25 TABLET, FILM COATED ORAL DAILY
Status: DISCONTINUED | OUTPATIENT
Start: 2025-05-02 | End: 2025-05-03 | Stop reason: HOSPADM

## 2025-05-01 RX ORDER — MEMANTINE HYDROCHLORIDE 5 MG/1
10 TABLET ORAL EVERY 12 HOURS
Status: DISCONTINUED | OUTPATIENT
Start: 2025-05-01 | End: 2025-05-03 | Stop reason: HOSPADM

## 2025-05-01 RX ORDER — GUAIFENESIN 600 MG/1
600 TABLET, EXTENDED RELEASE ORAL 2 TIMES DAILY PRN
Status: DISCONTINUED | OUTPATIENT
Start: 2025-05-01 | End: 2025-05-03 | Stop reason: HOSPADM

## 2025-05-01 RX ORDER — HYDRALAZINE HYDROCHLORIDE 20 MG/ML
10 INJECTION INTRAMUSCULAR; INTRAVENOUS EVERY 4 HOURS PRN
Status: DISCONTINUED | OUTPATIENT
Start: 2025-05-01 | End: 2025-05-03 | Stop reason: HOSPADM

## 2025-05-01 RX ORDER — QUETIAPINE FUMARATE 25 MG/1
25 TABLET, FILM COATED ORAL 2 TIMES DAILY
Status: DISCONTINUED | OUTPATIENT
Start: 2025-05-01 | End: 2025-05-02

## 2025-05-01 RX ORDER — BISACODYL 10 MG/1
10 SUPPOSITORY RECTAL DAILY PRN
Status: DISCONTINUED | OUTPATIENT
Start: 2025-05-01 | End: 2025-05-03 | Stop reason: HOSPADM

## 2025-05-01 RX ORDER — MAGNESIUM SULFATE HEPTAHYDRATE 40 MG/ML
2 INJECTION, SOLUTION INTRAVENOUS ONCE
Status: COMPLETED | OUTPATIENT
Start: 2025-05-01 | End: 2025-05-01

## 2025-05-01 RX ORDER — TALC
6 POWDER (GRAM) TOPICAL NIGHTLY PRN
Status: DISCONTINUED | OUTPATIENT
Start: 2025-05-01 | End: 2025-05-03 | Stop reason: HOSPADM

## 2025-05-01 RX ORDER — SERTRALINE HYDROCHLORIDE 50 MG/1
50 TABLET, FILM COATED ORAL DAILY
Status: DISCONTINUED | OUTPATIENT
Start: 2025-05-02 | End: 2025-05-03 | Stop reason: HOSPADM

## 2025-05-01 RX ORDER — HYDROXYZINE HYDROCHLORIDE 25 MG/1
25 TABLET, FILM COATED ORAL EVERY 4 HOURS PRN
Status: DISCONTINUED | OUTPATIENT
Start: 2025-05-01 | End: 2025-05-03 | Stop reason: HOSPADM

## 2025-05-01 RX ORDER — METOPROLOL TARTRATE 1 MG/ML
5 INJECTION, SOLUTION INTRAVENOUS EVERY 6 HOURS PRN
Status: DISCONTINUED | OUTPATIENT
Start: 2025-05-01 | End: 2025-05-03 | Stop reason: HOSPADM

## 2025-05-01 RX ORDER — PROCHLORPERAZINE EDISYLATE 5 MG/ML
10 INJECTION INTRAMUSCULAR; INTRAVENOUS EVERY 6 HOURS PRN
Status: DISCONTINUED | OUTPATIENT
Start: 2025-05-01 | End: 2025-05-03 | Stop reason: HOSPADM

## 2025-05-01 RX ORDER — ACETAMINOPHEN 325 MG/1
650 TABLET ORAL EVERY 4 HOURS PRN
Status: DISCONTINUED | OUTPATIENT
Start: 2025-05-01 | End: 2025-05-03 | Stop reason: HOSPADM

## 2025-05-01 RX ADMIN — CEFTRIAXONE 1 G: 1 INJECTION, SOLUTION INTRAVENOUS at 23:25

## 2025-05-01 RX ADMIN — QUETIAPINE FUMARATE 25 MG: 25 TABLET ORAL at 23:54

## 2025-05-01 RX ADMIN — MEMANTINE 10 MG: 5 TABLET ORAL at 23:54

## 2025-05-01 RX ADMIN — MAGNESIUM SULFATE HEPTAHYDRATE 2 G: 40 INJECTION, SOLUTION INTRAVENOUS at 19:45

## 2025-05-01 RX ADMIN — SODIUM CHLORIDE 500 ML: 0.9 INJECTION, SOLUTION INTRAVENOUS at 19:45

## 2025-05-01 RX ADMIN — DIVALPROEX SODIUM 125 MG: 125 CAPSULE, COATED PELLETS ORAL at 23:54

## 2025-05-01 RX ADMIN — APIXABAN 5 MG: 5 TABLET, FILM COATED ORAL at 23:54

## 2025-05-01 ASSESSMENT — COLUMBIA-SUICIDE SEVERITY RATING SCALE - C-SSRS
2. HAVE YOU ACTUALLY HAD ANY THOUGHTS OF KILLING YOURSELF?: NO
1. IN THE PAST MONTH, HAVE YOU WISHED YOU WERE DEAD OR WISHED YOU COULD GO TO SLEEP AND NOT WAKE UP?: NO
6. HAVE YOU EVER DONE ANYTHING, STARTED TO DO ANYTHING, OR PREPARED TO DO ANYTHING TO END YOUR LIFE?: NO

## 2025-05-01 ASSESSMENT — PAIN SCALES - GENERAL
PAINLEVEL_OUTOF10: 0 - NO PAIN

## 2025-05-01 ASSESSMENT — PAIN - FUNCTIONAL ASSESSMENT
PAIN_FUNCTIONAL_ASSESSMENT: 0-10
PAIN_FUNCTIONAL_ASSESSMENT: 0-10

## 2025-05-01 NOTE — ED TRIAGE NOTES
Pt arrives via EMS after a syncopal event while using the restroom today. Was witnessed and pt was lowered to the ground. Pt is from [facility]. Denies CP, SOB, abdominal pain. Only reports being a little tired. Per EMS baseline is A&Ox2, pt is A&Ox1 in triage. Only oriented to self.

## 2025-05-01 NOTE — ED PROVIDER NOTES
History/Exam limitations: dementia.   Additional history was obtained from patient, EMS personnel, and past medical records.          HPI:    Arielle Alcaraz is a 89 y.o. female PMH A-fib on Eliquis, hypertension, diabetes, hypothyroidism, gout presenting for evaluation after reported syncopal event.  Patient was reportedly on the toilet and lost consciousness.  Her aides were around her and there was reportedly no fall.  They gently eased her down to the ground reportedly.  She then came back to.  There is some concern that she may be slightly more confused than her baseline afterward.  She is reportedly alert and oriented x 2 at baseline.  Currently she knows that she is in the hospital and knows her name.  Is unsure of the date or why she is here.  She reports that she has some mild discomfort in her right thigh and knee.  She states that it was from being bit by a dog a long time ago.  Denies any headache, vision changes, chest pain, shortness of breath, abdominal pain.          Physical Exam:  ED Triage Vitals   Temp Heart Rate Respirations BP   -- 05/01/25 1833 05/01/25 1833 05/01/25 1833    51 (!) 22 119/65      Pulse Ox Temp src Heart Rate Source Patient Position   05/01/25 1834 -- -- --   94 %         BP Location FiO2 (%)     -- --              GEN:      Alert, mildly fatigued appearing  Eyes:       PERRL, EOMI  HENT:      NC/AT, OP clear, airway patent, MM  CV:      RRR, no MRG, no LE pitting edema, 2+ radial and pedal pulses  PULM:     CTAB, no w/r/r, easy WOB, symmetric chest rise  ABD:      Soft, NT, ND, no masses, BS +  :       No CVA TTP  NEURO:   Alert and oriented to self/hospital, A/Ox4, CN II-XII intact, strength 5/5 in all 4 ext, SILT, FNF normal b/l, no pronator drift  MSK:      FROM, no joint deformities or swelling, mild right knee and right thigh/hip tenderness, no deformity, no swelling or erythema, no edema, no midline CTL spine tenderness or step-offs  SKIN:       Warm and dry  PSYCH:     Appropriate mood and affect         MDM/ED Course:   Arielle Alcaraz is a 89 y.o. female PMH A-fib on Eliquis, hypertension, diabetes, hypothyroidism, gout presenting for evaluation after reported syncopal event.  Vitals and exam as documented above.  Differential includes syncope secondary to vasovagal, orthostatic hypotension, dehydration, arrhythmia, infectious etiology, ACS, stroke. Unlikely ACS given no chest pressure, chest pain, but has multiple risk factors including age and medical diseases. Doubt TIA/stroke given no report of focal neurologic deficits and evidence of this on exam.  Given patient is on toilet, high suspicion for vasovagal syncope.  No fall or trauma.  Is on anticoagulation, CT head obtained and no evidence of intracranial hemorrhage or other acute findings.  Previous episode coincided with RD and UTI.  IV placed and labs drawn.  Found to be hypomagnesemic, IV repletion given.  Mild leukocytosis present.  .  Euvolemic on exam.  Troponin negative x 2.  Urinalysis concerning for UTI with positive nitrate leuk esterase white cells and bacteria.  IV ceftriaxone given.  Patient was found have an RD as well with creatinine of 1.24 from last 0.85.  Mild tenderness to the right knee and distal femur, no swelling, no evidence of DVT on exam, x-rays obtained and degenerative changes present.  Patient initially thought this was new but subsequently feels this is a chronic issue.  Given RD, UTI, hypomagnesemia, plan was made for hospitalization patient was accepted by admitting team for continued management stable condition.    EKG reviewed by me: 7:20 PM likely atrial fibrillation, rate 53, left axis, normal QRS and QTc intervals, LVH, LAFB, no STEMI, similar to prior EKG from January 6, 2025.     Diagnoses as of 05/02/25 1502   Syncope, unspecified syncope type   Acute kidney injury   Hypomagnesemia   Urinary tract infection without hematuria, site unspecified         Chronic medical  conditions affecting care listed in MDM. I independently reviewed imaging studies and agreed with radiology reads. I reviewed recent and relevant outside records including PCP notes, Prior discharge summaries, and prior radiology reports.    Procedure  Procedures    Diagnosis:   1.  RD  2.  UTI  3.  Hypomagnesemia  4.  Syncope    Dispo: Hospitalized in stable condition      Disclaimer: Portions of this note were dictated by speech recognition. An attempt at proof reading was made to minimize errors. Minor errors in transcription may be present.  Please call if questions.     Doug Vaughn MD  05/02/25 3087

## 2025-05-02 VITALS
SYSTOLIC BLOOD PRESSURE: 138 MMHG | HEIGHT: 60 IN | RESPIRATION RATE: 16 BRPM | WEIGHT: 123.9 LBS | TEMPERATURE: 98 F | OXYGEN SATURATION: 99 % | BODY MASS INDEX: 24.32 KG/M2 | DIASTOLIC BLOOD PRESSURE: 102 MMHG | HEART RATE: 59 BPM

## 2025-05-02 LAB
ALBUMIN SERPL BCP-MCNC: 3.4 G/DL (ref 3.4–5)
ALP SERPL-CCNC: 74 U/L (ref 33–136)
ALT SERPL W P-5'-P-CCNC: 7 U/L (ref 7–45)
ANION GAP SERPL CALC-SCNC: 9 MMOL/L (ref 10–20)
AST SERPL W P-5'-P-CCNC: 13 U/L (ref 9–39)
BILIRUB SERPL-MCNC: 0.4 MG/DL (ref 0–1.2)
BUN SERPL-MCNC: 33 MG/DL (ref 6–23)
CALCIUM SERPL-MCNC: 9 MG/DL (ref 8.6–10.3)
CHLORIDE SERPL-SCNC: 111 MMOL/L (ref 98–107)
CO2 SERPL-SCNC: 26 MMOL/L (ref 21–32)
CREAT SERPL-MCNC: 1.05 MG/DL (ref 0.5–1.05)
EGFRCR SERPLBLD CKD-EPI 2021: 52 ML/MIN/1.73M*2
ERYTHROCYTE [DISTWIDTH] IN BLOOD BY AUTOMATED COUNT: 12 % (ref 11.5–14.5)
GLUCOSE SERPL-MCNC: 85 MG/DL (ref 74–99)
HCT VFR BLD AUTO: 32.9 % (ref 36–46)
HGB BLD-MCNC: 10.4 G/DL (ref 12–16)
HOLD SPECIMEN: 293
MAGNESIUM SERPL-MCNC: 1.93 MG/DL (ref 1.6–2.4)
MCH RBC QN AUTO: 28.5 PG (ref 26–34)
MCHC RBC AUTO-ENTMCNC: 31.6 G/DL (ref 32–36)
MCV RBC AUTO: 90 FL (ref 80–100)
NRBC BLD-RTO: 0 /100 WBCS (ref 0–0)
PLATELET # BLD AUTO: 126 X10*3/UL (ref 150–450)
POTASSIUM SERPL-SCNC: 4.5 MMOL/L (ref 3.5–5.3)
PROCALCITONIN SERPL-MCNC: 0.07 NG/ML
PROT SERPL-MCNC: 6 G/DL (ref 6.4–8.2)
Q ONSET: 211 MS
QRS COUNT: 8 BEATS
QRS DURATION: 98 MS
QT INTERVAL: 468 MS
QTC CALCULATION(BAZETT): 439 MS
QTC FREDERICIA: 449 MS
R AXIS: -53 DEGREES
RBC # BLD AUTO: 3.65 X10*6/UL (ref 4–5.2)
SODIUM SERPL-SCNC: 141 MMOL/L (ref 136–145)
T AXIS: -9 DEGREES
T OFFSET: 445 MS
VENTRICULAR RATE: 53 BPM
WBC # BLD AUTO: 7.9 X10*3/UL (ref 4.4–11.3)

## 2025-05-02 PROCEDURE — 84132 ASSAY OF SERUM POTASSIUM: CPT | Performed by: STUDENT IN AN ORGANIZED HEALTH CARE EDUCATION/TRAINING PROGRAM

## 2025-05-02 PROCEDURE — 2500000001 HC RX 250 WO HCPCS SELF ADMINISTERED DRUGS (ALT 637 FOR MEDICARE OP): Performed by: STUDENT IN AN ORGANIZED HEALTH CARE EDUCATION/TRAINING PROGRAM

## 2025-05-02 PROCEDURE — 96365 THER/PROPH/DIAG IV INF INIT: CPT | Mod: 59

## 2025-05-02 PROCEDURE — G0378 HOSPITAL OBSERVATION PER HR: HCPCS

## 2025-05-02 PROCEDURE — 84145 PROCALCITONIN (PCT): CPT | Mod: AHULAB | Performed by: STUDENT IN AN ORGANIZED HEALTH CARE EDUCATION/TRAINING PROGRAM

## 2025-05-02 PROCEDURE — 2500000002 HC RX 250 W HCPCS SELF ADMINISTERED DRUGS (ALT 637 FOR MEDICARE OP, ALT 636 FOR OP/ED)

## 2025-05-02 PROCEDURE — 2500000004 HC RX 250 GENERAL PHARMACY W/ HCPCS (ALT 636 FOR OP/ED): Mod: JZ

## 2025-05-02 PROCEDURE — 36415 COLL VENOUS BLD VENIPUNCTURE: CPT | Performed by: STUDENT IN AN ORGANIZED HEALTH CARE EDUCATION/TRAINING PROGRAM

## 2025-05-02 PROCEDURE — 2500000002 HC RX 250 W HCPCS SELF ADMINISTERED DRUGS (ALT 637 FOR MEDICARE OP, ALT 636 FOR OP/ED): Performed by: STUDENT IN AN ORGANIZED HEALTH CARE EDUCATION/TRAINING PROGRAM

## 2025-05-02 PROCEDURE — 96361 HYDRATE IV INFUSION ADD-ON: CPT | Mod: 59

## 2025-05-02 PROCEDURE — 83735 ASSAY OF MAGNESIUM: CPT | Performed by: STUDENT IN AN ORGANIZED HEALTH CARE EDUCATION/TRAINING PROGRAM

## 2025-05-02 PROCEDURE — 2500000005 HC RX 250 GENERAL PHARMACY W/O HCPCS: Performed by: STUDENT IN AN ORGANIZED HEALTH CARE EDUCATION/TRAINING PROGRAM

## 2025-05-02 PROCEDURE — 85027 COMPLETE CBC AUTOMATED: CPT | Performed by: STUDENT IN AN ORGANIZED HEALTH CARE EDUCATION/TRAINING PROGRAM

## 2025-05-02 RX ORDER — CEFTRIAXONE 1 G/50ML
1 INJECTION, SOLUTION INTRAVENOUS ONCE
Status: COMPLETED | OUTPATIENT
Start: 2025-05-02 | End: 2025-05-02

## 2025-05-02 RX ORDER — CEPHALEXIN 250 MG/1
250 CAPSULE ORAL 2 TIMES DAILY
Start: 2025-05-02 | End: 2025-05-09

## 2025-05-02 RX ORDER — ACETAMINOPHEN 500 MG
5 TABLET ORAL NIGHTLY
COMMUNITY

## 2025-05-02 RX ORDER — QUETIAPINE FUMARATE 25 MG/1
12.5 TABLET, FILM COATED ORAL 2 TIMES DAILY
Status: DISCONTINUED | OUTPATIENT
Start: 2025-05-02 | End: 2025-05-03 | Stop reason: HOSPADM

## 2025-05-02 RX ADMIN — QUETIAPINE FUMARATE 12.5 MG: 25 TABLET ORAL at 22:07

## 2025-05-02 RX ADMIN — DIVALPROEX SODIUM 125 MG: 125 CAPSULE, COATED PELLETS ORAL at 09:08

## 2025-05-02 RX ADMIN — SODIUM CHLORIDE 500 ML: 0.9 INJECTION, SOLUTION INTRAVENOUS at 14:14

## 2025-05-02 RX ADMIN — QUETIAPINE FUMARATE 25 MG: 25 TABLET ORAL at 09:06

## 2025-05-02 RX ADMIN — CEFTRIAXONE 1 G: 1 INJECTION, SOLUTION INTRAVENOUS at 13:42

## 2025-05-02 RX ADMIN — APIXABAN 5 MG: 5 TABLET, FILM COATED ORAL at 09:07

## 2025-05-02 RX ADMIN — SERTRALINE 50 MG: 50 TABLET, FILM COATED ORAL at 09:06

## 2025-05-02 RX ADMIN — MEMANTINE 10 MG: 5 TABLET ORAL at 10:03

## 2025-05-02 RX ADMIN — MEMANTINE 10 MG: 5 TABLET ORAL at 19:24

## 2025-05-02 RX ADMIN — METOPROLOL TARTRATE 25 MG: 25 TABLET, FILM COATED ORAL at 16:28

## 2025-05-02 RX ADMIN — APIXABAN 5 MG: 5 TABLET, FILM COATED ORAL at 22:07

## 2025-05-02 RX ADMIN — Medication 6 MG: at 19:23

## 2025-05-02 ASSESSMENT — ACTIVITIES OF DAILY LIVING (ADL): LACK_OF_TRANSPORTATION: PATIENT UNABLE TO ANSWER

## 2025-05-02 NOTE — H&P
History Of Present Illness  Arielle Alcaraz is a 89 y.o. female presenting with syncope, hypomagnesemic, RD (Cr is 1.24 baseline 0.85). Awaiting urine, denies symptoms but is also answering questions inappropriately.     Pt is unable to assist with HPI due to mental status/dementia. Entirety of HPI obtained from chart review and report.   Chief Complaint   Patient presents with    Syncope            Notes from ED physician and nurse reviewed. Case discussed with attending ED physician.     Past Medical History  Medical History[1]     Surgical History  Surgical History[2]   Recent Surgeries in Hospitalist            No cases to display           Surgical History[3]      Social History  Social History     Substance and Sexual Activity   Alcohol Use Never      Social History     Substance and Sexual Activity   Drug Use Never      Social History     Substance and Sexual Activity   Sexual Activity Not on file      Tobacco Use History[4]   Food Insecurity: Patient Unable To Answer (1/7/2025)    Hunger Vital Sign     Worried About Running Out of Food in the Last Year: Patient unable to answer     Ran Out of Food in the Last Year: Patient unable to answer      Financial Resource Strain: Patient Unable To Answer (1/7/2025)    Overall Financial Resource Strain (CARDIA)     Difficulty of Paying Living Expenses: Patient unable to answer      Intimate Partner Violence: Not At Risk (1/7/2025)    Humiliation, Afraid, Rape, and Kick questionnaire     Fear of Current or Ex-Partner: No     Emotionally Abused: No     Physically Abused: No     Sexually Abused: No      Transportation Needs: Patient Unable To Answer (1/7/2025)    PRAPARE - Transportation     Lack of Transportation (Medical): Patient unable to answer     Lack of Transportation (Non-Medical): Patient unable to answer        Family History  Family History[5]      Allergies  RX Allergies[6]   Allergies[7]     Last Recorded Vitals  Visit Vitals  BP (!) 147/116   Pulse 54    Temp 36.6 °C (97.9 °F) (Oral)   Resp 18      Visit Vitals  BP (!) 147/116   Pulse 54   Temp 36.6 °C (97.9 °F) (Oral)   Resp 18   Ht 1.524 m (5')   Wt 56.2 kg (123 lb 14.4 oz)   SpO2 99%   BMI 24.20 kg/m²   Smoking Status Never   BSA 1.54 m²        Relevant Results      Results for orders placed or performed during the hospital encounter of 05/01/25 (from the past 24 hours)   CBC and Auto Differential   Result Value Ref Range    WBC 12.1 (H) 4.4 - 11.3 x10*3/uL    nRBC 0.0 0.0 - 0.0 /100 WBCs    RBC 4.48 4.00 - 5.20 x10*6/uL    Hemoglobin 12.6 12.0 - 16.0 g/dL    Hematocrit 40.4 36.0 - 46.0 %    MCV 90 80 - 100 fL    MCH 28.1 26.0 - 34.0 pg    MCHC 31.2 (L) 32.0 - 36.0 g/dL    RDW 12.0 11.5 - 14.5 %    Platelets 157 150 - 450 x10*3/uL    Neutrophils % 65.1 40.0 - 80.0 %    Immature Granulocytes %, Automated 0.3 0.0 - 0.9 %    Lymphocytes % 26.8 13.0 - 44.0 %    Monocytes % 7.4 2.0 - 10.0 %    Eosinophils % 0.2 0.0 - 6.0 %    Basophils % 0.2 0.0 - 2.0 %    Neutrophils Absolute 7.86 (H) 1.60 - 5.50 x10*3/uL    Immature Granulocytes Absolute, Automated 0.04 0.00 - 0.50 x10*3/uL    Lymphocytes Absolute 3.24 (H) 0.80 - 3.00 x10*3/uL    Monocytes Absolute 0.90 (H) 0.05 - 0.80 x10*3/uL    Eosinophils Absolute 0.03 0.00 - 0.40 x10*3/uL    Basophils Absolute 0.03 0.00 - 0.10 x10*3/uL   Magnesium   Result Value Ref Range    Magnesium 1.36 (L) 1.60 - 2.40 mg/dL   Comprehensive metabolic panel   Result Value Ref Range    Glucose 189 (H) 74 - 99 mg/dL    Sodium 141 136 - 145 mmol/L    Potassium 4.7 3.5 - 5.3 mmol/L    Chloride 109 (H) 98 - 107 mmol/L    Bicarbonate 26 21 - 32 mmol/L    Anion Gap 11 10 - 20 mmol/L    Urea Nitrogen 38 (H) 6 - 23 mg/dL    Creatinine 1.24 (H) 0.50 - 1.05 mg/dL    eGFR 42 (L) >60 mL/min/1.73m*2    Calcium 9.0 8.6 - 10.3 mg/dL    Albumin 3.6 3.4 - 5.0 g/dL    Alkaline Phosphatase 81 33 - 136 U/L    Total Protein 6.1 (L) 6.4 - 8.2 g/dL    AST 15 9 - 39 U/L    Bilirubin, Total 0.5 0.0 - 1.2 mg/dL     ALT 8 7 - 45 U/L   B-Type Natriuretic Peptide   Result Value Ref Range     (H) 0 - 99 pg/mL   Troponin I, High Sensitivity, Initial   Result Value Ref Range    Troponin I, High Sensitivity 9 0 - 13 ng/L   Troponin, High Sensitivity, 1 Hour   Result Value Ref Range    Troponin I, High Sensitivity 9 0 - 13 ng/L      Imaging  XR chest 1 view  Result Date: 5/1/2025  No acute pulmonary abnormality. Signed by Martín Hernández MD    XR hip right with pelvis when performed 2 or 3 views  Result Date: 5/1/2025  Chronic appearing deformity of the right hip. There is partial collapse of the right femoral head with superimposed marked osteoarthritic degenerative changes. No acute bony abnormality is apparent. Signed by Derek Varner MD    XR femur right 2+ views  Result Date: 5/1/2025  No acute osseous abnormality. Signed by Charles Portillo MD    CT head wo IV contrast  Result Date: 5/1/2025  Moderate brain atrophy and diffuse subcortical and periventricular white matter changes which given patient's age are suggestive of chronic small vessel ischemic disease. No evidence of acute cortical infarct or intracranial hemorrhage. If there is persistent clinical concern for acute cortical infarction, MRI with diffusion-weighted images is a better means for further evaluation as clinically warranted.   MACRO: None   Signed by: Osbaldo Crawford 5/1/2025 8:42 PM Dictation workstation:   [id number]    XR knee right 4+ views  Result Date: 5/1/2025  Demineralization does limit sensitivity. No definite fracture seen. If the patient is unable to bear weight, consider CT     MACRO: None   Signed by: Nicholas Keller 5/1/2025 7:43 PM Dictation workstation:   [id number]      Cardiology, Vascular, and Other Imaging  No other imaging results found for the past 2 days       I personally reviewed and interpreted the above results, multiple of which contributed to my medical making decisions.        Assessment/Plan        AMS  Syncope  Dementia  -admit to obs with tele  -etiology likely vasovagal, no postictal period reported, pt did not bite tongue or urinate  -orthostatic pressures   -will need to weigh risk/benefits of eliquis  -continue home depakote  -continue home sertraline, memantine and seroquel  -r/o infection, procal and UA pending    RD  -gentle hydration  -hold home lisinopril    HTN  -home lisinopril held due to RD  -continue home lopressor  -PRNs ordered      Fluids: bolus PRN, encourage PO  Nutrition: regular  Bowel prophylaxis:   DVT prophylaxis:  eliqugina Longo MD          [1] No past medical history on file.  [2] No past surgical history on file.  [3] No past surgical history on file.  [4]   Social History  Tobacco Use   Smoking Status Never    Passive exposure: Never   Smokeless Tobacco Never   [5] No family history on file.  [6]   Allergies  Allergen Reactions    Aricept [Donepezil] Unknown    Ativan [Lorazepam] Unknown    Ciprofloxacin Unknown    Codeine Unknown    Propoxyphene Unknown   [7]   Allergies  Allergen Reactions    Aricept [Donepezil] Unknown    Ativan [Lorazepam] Unknown    Ciprofloxacin Unknown    Codeine Unknown    Propoxyphene Unknown

## 2025-05-02 NOTE — PROGRESS NOTES
5/2/2025 9:40 AM I spoke to patient's legal guardian. He said plan is for patient to return to [facility]. He has patient's name as Arielle Bansal. Jenelle Parker [facility]

## 2025-05-02 NOTE — DISCHARGE SUMMARY
Discharge Diagnosis  Syncope and collapse      Issues Requiring Follow-Up  Follow up with PCP/facility physician     Discharge Meds     Medication List      START taking these medications     cephalexin 250 mg capsule; Commonly known as: Keflex; Take 1 capsule   (250 mg) by mouth 2 times a day for 7 days.     CHANGE how you take these medications     apixaban 2.5 mg tablet; Commonly known as: Eliquis; Take 1 tablet (2.5   mg) by mouth 2 times a day.; What changed: medication strength, how much   to take     CONTINUE taking these medications     acetaminophen 500 mg tablet; Commonly known as: Tylenol   cholecalciferol 25 mcg (1,000 units) tablet; Commonly known as: Vitamin   D-3   cyanocobalamin 500 mcg tablet; Commonly known as: Vitamin B-12   lisinopril 10 mg tablet   melatonin 5 mg tablet   memantine 10 mg tablet; Commonly known as: Namenda   Metamucil Fiber Singles 3.4 gram packet; Generic drug: psyllium; Take 1   packet by mouth once daily. Mix with water as directed before drinking   metoprolol tartrate 25 mg tablet; Commonly known as: Lopressor   pantoprazole 40 mg EC tablet; Commonly known as: ProtoNix; Take 1 tablet   (40 mg) by mouth every 12 hours. Do not crush, chew, or split.   QUEtiapine 25 mg tablet; Commonly known as: SEROquel   sertraline 50 mg tablet; Commonly known as: Zoloft       Test Results Pending At Discharge  Pending Labs       Order Current Status    Urine Culture In process            Hospital Course  Arielle Alcaraz is an 89 year old female who presented to [facility] after a witnessed syncopal episode at her long term care facility. Pt was on the toilet when she reportedly lost consciousness and was lowered to the ground by staff. Suspect etiology of syncope was vasovagal. Pt was notably confused and unable to provide details. She had imaging in the ED which included CT head, CXR, XR R femur, XR R knee, and XR R hip all of which was negative for acute process. UA was suspicious for UTI and she  was started on abx. She was also given IV magnesium for hypomagnesemia. Orthostatic vitals were positive and she was given gentle rehydration with a liter of fluid. Suspect etiology of syncope was vasovagal. Pt was notably confused and unable to provide details. She was ultimately cleared for discharge following rehydration and BP improvement.     DC plan: Continue Keflex for suspected UTI. Reduce Eliquis to 2.5mg BID as pt in on it for Afib and >80 years of age and <60kg. She is also a fall risk. Follow up with PCP/facility physician.     Labs and vitals stable for discharge. Discharged in stable and satisfactory condition.     Discussed with Dr. Finnegan and the interdisciplinary team        Pertinent Physical Exam At Time of Discharge  Physical Exam  Constitutional:       General: She is not in acute distress.     Appearance: Normal appearance. She is not ill-appearing or toxic-appearing.   HENT:      Head: Normocephalic and atraumatic.   Cardiovascular:      Rate and Rhythm: Normal rate and regular rhythm.      Pulses: Normal pulses.      Heart sounds: Murmur (+ systolic murmur) heard.      No friction rub. No gallop.   Pulmonary:      Effort: Pulmonary effort is normal.      Breath sounds: Normal breath sounds. No wheezing, rhonchi or rales.   Abdominal:      General: There is no distension.      Palpations: Abdomen is soft. There is no mass.      Tenderness: There is no abdominal tenderness.   Musculoskeletal:      Right lower leg: No edema.      Left lower leg: No edema.   Skin:     General: Skin is warm and dry.   Neurological:      Mental Status: She is alert. Mental status is at baseline.   Psychiatric:         Mood and Affect: Mood normal.         Behavior: Behavior normal.         Outpatient Follow-Up  No future appointments.      Neelam Acevedo PA-C

## 2025-05-02 NOTE — PROGRESS NOTES
05/02/25 0814   [facility] Disability Status   Are you deaf or do you have serious difficulty hearing? N   Are you blind or do you have serious difficulty seeing, even when wearing glasses? N   Because of a physical, mental, or emotional condition, do you have serious difficulty concentrating, remembering, or making decisions? (5 years old or older) Y   Do you have serious difficulty walking or climbing stairs? Y   Do you have serious difficulty dressing or bathing? Y   Because of a physical, mental, or emotional condition, do you have serious difficulty doing errands alone such as visiting the doctor? Y     Ju Mccain RN

## 2025-05-02 NOTE — PROGRESS NOTES
Pharmacy Medication History    Patient from [facility], called for  current med list    Source of Information:    Additional concerns with the patient's PTA list.     Notified Provider via Haiku : Yes    The following updates were made to the Prior to Admission medication list:     Medications ADDED:     Medications CHANGED:  Seroquel 1/2 tab BID  Medications REMOVED:   Depakote - no longer on med list  Medications NOT TAKING:       Allergy reviewed : Yes    Meds 2 Beds : No    Outpatient pharmacy confirmed and updated in chart : N/A    Pharmacy name:     The list below reflectives the updated PTA list. Please review each medication in order reconciliation for additional clarification and justification.    Prior to Admission Medications   Prescriptions Last Dose   QUEtiapine (SEROquel) 25 mg tablet    Sig: Take 0.5 tablets (12.5 mg) by mouth 2 times a day.   acetaminophen (Tylenol) 500 mg tablet    Sig: Take 2 tablets (1,000 mg) by mouth 2 times a day as needed for mild pain (1 - 3).   apixaban (Eliquis) 5 mg tablet    Sig: Take 1 tablet (5 mg) by mouth 2 times a day.   cholecalciferol (Vitamin D-3) 25 MCG (1000 UT) tablet    Sig: Take 1 tablet (1,000 Units) by mouth once daily.   cyanocobalamin (Vitamin B-12) 500 mcg tablet    Sig: Take 1 tablet (500 mcg) by mouth once daily.   lisinopril 10 mg tablet    Sig: Take 1 tablet (10 mg) by mouth once daily.   melatonin 5 mg tablet    Sig: Take 1 tablet (5 mg) by mouth once daily at bedtime.   memantine (Namenda) 10 mg tablet    Sig: Take 1 tablet (10 mg) by mouth 2 times a day.   metoprolol tartrate (Lopressor) 25 mg tablet    Sig: Take 1 tablet (25 mg) by mouth once daily.   pantoprazole (ProtoNix) 40 mg EC tablet    Sig: Take 1 tablet (40 mg) by mouth every 12 hours. Do not crush, chew, or split.   psyllium (Metamucil Fiber Singles) 3.4 gram packet    Sig: Take 1 packet by mouth once daily. Mix with water as directed before drinking   sertraline (Zoloft) 50 mg  tablet    Sig: Take 1 tablet (50 mg) by mouth once daily.      Facility-Administered Medications: None       The list below reflectives the updated allergy list. Please review each documented allergy for additional clarification and justification.    Allergies   Allergen Reactions    Aricept [Donepezil] Unknown    Ativan [Lorazepam] Unknown    Ciprofloxacin Unknown    Codeine Unknown    Propoxyphene Unknown          05/02/25 at 9:45 AM - Latia Aguirre

## 2025-05-02 NOTE — PROGRESS NOTES
05/02/25 0814   Discharge Planning   Living Arrangements Alone   Support Systems Other (Comment)  (has a guardian)   Assistance Needed Recieves assistance with ADLs, transfers   Type of Residence Nursing home/residential care  ([facility] memory unit)   Do you have animals or pets at home? No   Home or Post Acute Services Post acute facilities (Rehab/SNF/etc)   Expected Discharge Disposition Inter   Does the patient need discharge transport arranged? Yes   RoundTrip coordination needed? Yes   Has discharge transport been arranged? No   Financial Resource Strain   How hard is it for you to pay for the very basics like food, housing, medical care, and heating? Pt Unable   Housing Stability   In the last 12 months, was there a time when you were not able to pay the mortgage or rent on time? Pt Unable   In the past 12 months, how many times have you moved where you were living? 0   At any time in the past 12 months, were you homeless or living in a shelter (including now)? Pt Unable   Transportation Needs   In the past 12 months, has lack of transportation kept you from medical appointments or from getting medications? Pt Unable   In the past 12 months, has lack of transportation kept you from meetings, work, or from getting things needed for daily living? Pt Unable   Patient Choice   Provider Choice list and CMS website (https://medicare.gov/care-compare#search) for post-acute Quality and Resource Measure Data were provided and reviewed with: Other (Comment)   Patient / Family choosing to utilize agency / facility established prior to hospitalization Yes   Stroke Family Assessment   Stroke Family Assessment Needed No   Intensity of Service   Intensity of Service 0-30 min     Arielle Alcaraz is a 89 y.o. female on day 1 of admission presenting with Syncope and collapse.  Patient has demenita.  PLAN/BARRIER: UA, admit to obs with tele, gentle hydrations  DISP: return to [facility], SW to confirm with  guardian that patient is to return.  ADOD: 1-3 days  [facility]  Ju Mccain RN     5/2/25  @ 0909  Per [facility], patient may return to I[facility] and their are no barriers for her to return.  Ju Mccain RN

## 2025-05-03 NOTE — NURSING NOTE
Patient discharged back to [facility]. [facility] here now to transport patient via [facility].

## 2025-05-04 LAB — BACTERIA UR CULT: ABNORMAL

## 2025-05-07 ENCOUNTER — HOSPITAL ENCOUNTER (EMERGENCY)
Facility: HOSPITAL | Age: 89
Discharge: NURSING, REHAB OR LONG-TERM CARE | End: 2025-05-08
Attending: STUDENT IN AN ORGANIZED HEALTH CARE EDUCATION/TRAINING PROGRAM
Payer: COMMERCIAL

## 2025-05-07 ENCOUNTER — APPOINTMENT (OUTPATIENT)
Dept: CARDIOLOGY | Facility: HOSPITAL | Age: 89
End: 2025-05-07
Payer: COMMERCIAL

## 2025-05-07 ENCOUNTER — APPOINTMENT (OUTPATIENT)
Dept: RADIOLOGY | Facility: HOSPITAL | Age: 89
End: 2025-05-07
Payer: COMMERCIAL

## 2025-05-07 DIAGNOSIS — K52.9 GASTROENTERITIS: ICD-10-CM

## 2025-05-07 DIAGNOSIS — R91.8 LUNG NODULES: Primary | ICD-10-CM

## 2025-05-07 LAB
ALBUMIN SERPL BCP-MCNC: 4 G/DL (ref 3.4–5)
ALP SERPL-CCNC: 94 U/L (ref 33–136)
ALT SERPL W P-5'-P-CCNC: 9 U/L (ref 7–45)
ANION GAP BLDV CALCULATED.4IONS-SCNC: 11 MMOL/L (ref 10–25)
ANION GAP SERPL CALC-SCNC: 14 MMOL/L (ref 10–20)
APPEARANCE UR: CLEAR
AST SERPL W P-5'-P-CCNC: 22 U/L (ref 9–39)
BASE EXCESS BLDV CALC-SCNC: 2.8 MMOL/L (ref -2–3)
BASOPHILS # BLD AUTO: 0.03 X10*3/UL (ref 0–0.1)
BASOPHILS NFR BLD AUTO: 0.5 %
BILIRUB SERPL-MCNC: 0.6 MG/DL (ref 0–1.2)
BILIRUB UR STRIP.AUTO-MCNC: NEGATIVE MG/DL
BODY TEMPERATURE: 37 DEGREES CELSIUS
BUN SERPL-MCNC: 30 MG/DL (ref 6–23)
CA-I BLDV-SCNC: 1.21 MMOL/L (ref 1.1–1.33)
CALCIUM SERPL-MCNC: 9.3 MG/DL (ref 8.6–10.3)
CARDIAC TROPONIN I PNL SERPL HS: 12 NG/L (ref 0–13)
CHLORIDE BLDV-SCNC: 105 MMOL/L (ref 98–107)
CHLORIDE SERPL-SCNC: 103 MMOL/L (ref 98–107)
CO2 SERPL-SCNC: 25 MMOL/L (ref 21–32)
COLOR UR: COLORLESS
CREAT SERPL-MCNC: 0.99 MG/DL (ref 0.5–1.05)
EGFRCR SERPLBLD CKD-EPI 2021: 55 ML/MIN/1.73M*2
EOSINOPHIL # BLD AUTO: 0.05 X10*3/UL (ref 0–0.4)
EOSINOPHIL NFR BLD AUTO: 0.8 %
ERYTHROCYTE [DISTWIDTH] IN BLOOD BY AUTOMATED COUNT: 11.9 % (ref 11.5–14.5)
GLUCOSE BLDV-MCNC: 89 MG/DL (ref 74–99)
GLUCOSE SERPL-MCNC: 86 MG/DL (ref 74–99)
GLUCOSE UR STRIP.AUTO-MCNC: NORMAL MG/DL
HCO3 BLDV-SCNC: 25.3 MMOL/L (ref 22–26)
HCT VFR BLD AUTO: 32.3 % (ref 36–46)
HCT VFR BLD EST: 32 % (ref 36–46)
HGB BLD-MCNC: 10.6 G/DL (ref 12–16)
HGB BLDV-MCNC: 10.5 G/DL (ref 12–16)
IMM GRANULOCYTES # BLD AUTO: 0.02 X10*3/UL (ref 0–0.5)
IMM GRANULOCYTES NFR BLD AUTO: 0.3 % (ref 0–0.9)
INHALED O2 CONCENTRATION: 21 %
KETONES UR STRIP.AUTO-MCNC: NEGATIVE MG/DL
LACTATE BLDV-SCNC: 1 MMOL/L (ref 0.4–2)
LACTATE SERPL-SCNC: 0.7 MMOL/L (ref 0.4–2)
LEUKOCYTE ESTERASE UR QL STRIP.AUTO: NEGATIVE
LIPASE SERPL-CCNC: 41 U/L (ref 9–82)
LYMPHOCYTES # BLD AUTO: 2.33 X10*3/UL (ref 0.8–3)
LYMPHOCYTES NFR BLD AUTO: 38.9 %
MAGNESIUM SERPL-MCNC: 1.3 MG/DL (ref 1.6–2.4)
MCH RBC QN AUTO: 28.4 PG (ref 26–34)
MCHC RBC AUTO-ENTMCNC: 32.8 G/DL (ref 32–36)
MCV RBC AUTO: 87 FL (ref 80–100)
MONOCYTES # BLD AUTO: 0.62 X10*3/UL (ref 0.05–0.8)
MONOCYTES NFR BLD AUTO: 10.4 %
NEUTROPHILS # BLD AUTO: 2.94 X10*3/UL (ref 1.6–5.5)
NEUTROPHILS NFR BLD AUTO: 49.1 %
NITRITE UR QL STRIP.AUTO: NEGATIVE
NRBC BLD-RTO: 0 /100 WBCS (ref 0–0)
OXYHGB MFR BLDV: 28 % (ref 45–75)
PCO2 BLDV: 31 MM HG (ref 41–51)
PH BLDV: 7.52 PH (ref 7.33–7.43)
PH UR STRIP.AUTO: 7.5 [PH]
PLATELET # BLD AUTO: 141 X10*3/UL (ref 150–450)
PO2 BLDV: 23 MM HG (ref 35–45)
POTASSIUM BLDV-SCNC: 4.7 MMOL/L (ref 3.5–5.3)
POTASSIUM SERPL-SCNC: 4.3 MMOL/L (ref 3.5–5.3)
PROT SERPL-MCNC: 7.1 G/DL (ref 6.4–8.2)
PROT UR STRIP.AUTO-MCNC: NEGATIVE MG/DL
RBC # BLD AUTO: 3.73 X10*6/UL (ref 4–5.2)
RBC # UR STRIP.AUTO: NEGATIVE MG/DL
SAO2 % BLDV: 28 % (ref 45–75)
SODIUM BLDV-SCNC: 137 MMOL/L (ref 136–145)
SODIUM SERPL-SCNC: 138 MMOL/L (ref 136–145)
SP GR UR STRIP.AUTO: 1.02
UROBILINOGEN UR STRIP.AUTO-MCNC: NORMAL MG/DL
WBC # BLD AUTO: 6 X10*3/UL (ref 4.4–11.3)

## 2025-05-07 PROCEDURE — 84075 ASSAY ALKALINE PHOSPHATASE: CPT | Performed by: STUDENT IN AN ORGANIZED HEALTH CARE EDUCATION/TRAINING PROGRAM

## 2025-05-07 PROCEDURE — 2550000001 HC RX 255 CONTRASTS: Performed by: STUDENT IN AN ORGANIZED HEALTH CARE EDUCATION/TRAINING PROGRAM

## 2025-05-07 PROCEDURE — 96375 TX/PRO/DX INJ NEW DRUG ADDON: CPT | Mod: 59

## 2025-05-07 PROCEDURE — 83735 ASSAY OF MAGNESIUM: CPT | Performed by: STUDENT IN AN ORGANIZED HEALTH CARE EDUCATION/TRAINING PROGRAM

## 2025-05-07 PROCEDURE — 74174 CTA ABD&PLVS W/CONTRAST: CPT

## 2025-05-07 PROCEDURE — 83690 ASSAY OF LIPASE: CPT | Performed by: STUDENT IN AN ORGANIZED HEALTH CARE EDUCATION/TRAINING PROGRAM

## 2025-05-07 PROCEDURE — 96365 THER/PROPH/DIAG IV INF INIT: CPT | Mod: 59

## 2025-05-07 PROCEDURE — 84484 ASSAY OF TROPONIN QUANT: CPT | Performed by: STUDENT IN AN ORGANIZED HEALTH CARE EDUCATION/TRAINING PROGRAM

## 2025-05-07 PROCEDURE — 82810 BLOOD GASES O2 SAT ONLY: CPT | Mod: CCI | Performed by: STUDENT IN AN ORGANIZED HEALTH CARE EDUCATION/TRAINING PROGRAM

## 2025-05-07 PROCEDURE — 2500000004 HC RX 250 GENERAL PHARMACY W/ HCPCS (ALT 636 FOR OP/ED): Mod: JZ | Performed by: STUDENT IN AN ORGANIZED HEALTH CARE EDUCATION/TRAINING PROGRAM

## 2025-05-07 PROCEDURE — 96376 TX/PRO/DX INJ SAME DRUG ADON: CPT | Mod: 59

## 2025-05-07 PROCEDURE — 83605 ASSAY OF LACTIC ACID: CPT | Mod: 91 | Performed by: STUDENT IN AN ORGANIZED HEALTH CARE EDUCATION/TRAINING PROGRAM

## 2025-05-07 PROCEDURE — 93005 ELECTROCARDIOGRAM TRACING: CPT

## 2025-05-07 PROCEDURE — 99285 EMERGENCY DEPT VISIT HI MDM: CPT | Mod: 25

## 2025-05-07 PROCEDURE — 96366 THER/PROPH/DIAG IV INF ADDON: CPT

## 2025-05-07 PROCEDURE — 81003 URINALYSIS AUTO W/O SCOPE: CPT | Performed by: STUDENT IN AN ORGANIZED HEALTH CARE EDUCATION/TRAINING PROGRAM

## 2025-05-07 PROCEDURE — 85025 COMPLETE CBC W/AUTO DIFF WBC: CPT | Performed by: STUDENT IN AN ORGANIZED HEALTH CARE EDUCATION/TRAINING PROGRAM

## 2025-05-07 PROCEDURE — 74174 CTA ABD&PLVS W/CONTRAST: CPT | Performed by: RADIOLOGY

## 2025-05-07 PROCEDURE — 36415 COLL VENOUS BLD VENIPUNCTURE: CPT | Performed by: STUDENT IN AN ORGANIZED HEALTH CARE EDUCATION/TRAINING PROGRAM

## 2025-05-07 RX ORDER — MAGNESIUM SULFATE HEPTAHYDRATE 40 MG/ML
2 INJECTION, SOLUTION INTRAVENOUS ONCE
Status: COMPLETED | OUTPATIENT
Start: 2025-05-07 | End: 2025-05-07

## 2025-05-07 RX ORDER — ONDANSETRON HYDROCHLORIDE 2 MG/ML
4 INJECTION, SOLUTION INTRAVENOUS ONCE
Status: COMPLETED | OUTPATIENT
Start: 2025-05-07 | End: 2025-05-07

## 2025-05-07 RX ORDER — MORPHINE SULFATE 2 MG/ML
2 INJECTION, SOLUTION INTRAMUSCULAR; INTRAVENOUS ONCE
Status: COMPLETED | OUTPATIENT
Start: 2025-05-07 | End: 2025-05-07

## 2025-05-07 RX ADMIN — MORPHINE SULFATE 2 MG: 2 INJECTION, SOLUTION INTRAMUSCULAR; INTRAVENOUS at 19:13

## 2025-05-07 RX ADMIN — MAGNESIUM SULFATE HEPTAHYDRATE 2 G: 40 INJECTION, SOLUTION INTRAVENOUS at 19:17

## 2025-05-07 RX ADMIN — ONDANSETRON 4 MG: 2 INJECTION, SOLUTION INTRAMUSCULAR; INTRAVENOUS at 18:30

## 2025-05-07 RX ADMIN — IOHEXOL 90 ML: 350 INJECTION, SOLUTION INTRAVENOUS at 19:47

## 2025-05-07 RX ADMIN — MORPHINE SULFATE 2 MG: 2 INJECTION, SOLUTION INTRAMUSCULAR; INTRAVENOUS at 18:30

## 2025-05-07 ASSESSMENT — PAIN SCALES - GENERAL
PAINLEVEL_OUTOF10: 8
PAINLEVEL_OUTOF10: 0 - NO PAIN
PAINLEVEL_OUTOF10: 0 - NO PAIN

## 2025-05-07 ASSESSMENT — PAIN - FUNCTIONAL ASSESSMENT
PAIN_FUNCTIONAL_ASSESSMENT: 0-10

## 2025-05-07 ASSESSMENT — PAIN DESCRIPTION - LOCATION: LOCATION: ABDOMEN

## 2025-05-07 ASSESSMENT — PAIN DESCRIPTION - PAIN TYPE: TYPE: ACUTE PAIN

## 2025-05-07 NOTE — ED PROVIDER NOTES
History of Present Illness     History provided by: EMS and Nursing Staff  Limitations to History: Dementia  External Records Reviewed with Brief Summary: Nursing home paperwork which showed current medications and also patient's history of GI bleeding.    HPI:  Arielle Alcaraz is a 89 y.o. female who presents from [facility] with complaints of abdominal pain.  She does have a history of dementia and is unable to provide any history.  Currently complaining of abdominal pain.  Per the care home, had an episode of shaking for 1 second per her care home staff.  Was complaining of right upper quadrant pain.  Per chart review does have a history of GI bleed 1 year ago.  But nothing recent.  Has had multiple episodes of syncope.  No reported history of syncope at this time.  No black tarry stools reported.    Physical Exam   Triage vitals:  T 36.9 °C (98.4 °F)  HR 63  BP (!) 158/119  RR 15  O2 100 % None (Room air)    Physical Exam  Vitals and nursing note reviewed.   Constitutional:       Appearance: She is ill-appearing.   HENT:      Head: Atraumatic.      Mouth/Throat:      Pharynx: Oropharynx is clear.   Eyes:      Extraocular Movements: Extraocular movements intact.      Pupils: Pupils are equal, round, and reactive to light.   Cardiovascular:      Rate and Rhythm: Normal rate and regular rhythm.      Heart sounds: Normal heart sounds.   Pulmonary:      Effort: Tachypnea present.      Breath sounds: Normal breath sounds.   Abdominal:      General: Abdomen is flat.      Tenderness: There is abdominal tenderness (Diffuse). There is guarding and rebound.   Genitourinary:     Rectum: Normal. Guaiac result negative.   Musculoskeletal:      Right lower leg: Edema (2+) present.      Left lower leg: Edema (2+) present.   Skin:     General: Skin is warm and dry.      Capillary Refill: Capillary refill takes 2 to 3 seconds.   Neurological:      Mental Status: She is alert. Mental status is at baseline.      GCS: GCS eye  subscore is 4. GCS verbal subscore is 4. GCS motor subscore is 6.          Medical Decision Making & ED Course   ED Course:  ED Course as of 05/07/25 2237   Wed May 07, 2025   1827 Patient is a 89-year-old female who presents from [facility] with complaints of abdominal pain.  She does have a history of dementia and is unable to provide any history.  Currently complaining of abdominal pain.  Per the care home, had an episode of shaking for 1 SeHCAT per staff.  Was complaining of right upper quadrant pain.  Per chart review does have a history of GI bleed 1 year ago.  But nothing recent.  Has had multiple episodes of syncope.  No reported history of syncope at this time.  No black tarry stools reported.    On exam, patient appears significantly uncomfortable with diffuse abdominal pain was otherwise abdomen is soft.  Rectal exam completed no bright red blood or melena.  Heart lungs clear to auscultation not tachycardic or tachypneic.  Pulse equal in all 4 extremities, does have 2+ pitting edema to bilateral lower extremities [KK]   1845 EKG individual interpretation atrial fibrillation 16 bpm, QRS 96, QTc 460  Left axis deviation no's ST segment elevation or depression, no abnormal T wave inversions, incomplete right bundle branch block. [KK]   1905 Lactate: 0.7 [KK]   2024 Bladder scan 300 ml. Will give time to see if she can urinate, if she continues to retain will place vazquez. [CG]   2100 CTAP Moderate colonic stool burden and fluid contents in the cecum which may be infectious or inflammatory. No evidence of abnormal wall thickening, hypoenhancement or inflammatory changes. Other chronic findings as above. [CG]   2132 UA negative. She urinated on her own. Repeat bladder scan 170 ml.  [CG]   2152 Patient was reevaluated.  She is feeling better.  No abdominal tenderness.  Patient was discharged home with anticipatory guidance and strict return precautions.  She is advised of her incidental lung nodules and this  was documented in her discharge paperwork. [CG]      ED Course User Index  [CG] Virginie Mendoza MD  [KK] Peter Briceno DO         Diagnoses as of 25 2237   Lung nodules   Gastroenteritis       Medical Decision Makin y.o. female presented with diffuse abdominal pain from her care home.  Extremely poor historian but abdominal tenderness on exam.  Lab workup largely unremarkable.  Still waiting on urinalysis as well as CT abdomen pelvis.  Pain well-controlled with small amounts of morphine.  ----    Differential diagnoses considered include but are not limited to: see ED Course     EKG Independent Interpretation: see ED course     Independent Result Review and Interpretation: see ED course     Chronic conditions affecting the patient's care: as documented in ED course/MDM    The patient was discussed with the following consultants/services: see ED course    Care Considerations: as documented in ED course/MDM      Disposition   Patient was signed out to Dr. Mendoza pending CT abdomen pelvis and urinalysis.  Please see ED workup for the remainder of the patient's care.     Procedures   Procedures       Peter Briceno DO  25 2237

## 2025-05-07 NOTE — ED TRIAGE NOTES
Pt presents to the ED from [facility] with c/o abd pain. Pt has hx of dementia. Pt states to have pain in RUQ at times, but denies at others. Pt also had an episode of shaking for 1 second per staff at [facility]. Pt Aox1.

## 2025-05-08 VITALS
WEIGHT: 123 LBS | SYSTOLIC BLOOD PRESSURE: 131 MMHG | BODY MASS INDEX: 24.15 KG/M2 | OXYGEN SATURATION: 96 % | HEIGHT: 60 IN | DIASTOLIC BLOOD PRESSURE: 44 MMHG | RESPIRATION RATE: 14 BRPM | TEMPERATURE: 98.4 F | HEART RATE: 55 BPM

## 2025-05-08 ASSESSMENT — PAIN SCALES - GENERAL: PAINLEVEL_OUTOF10: 0 - NO PAIN

## 2025-05-08 ASSESSMENT — PAIN - FUNCTIONAL ASSESSMENT: PAIN_FUNCTIONAL_ASSESSMENT: 0-10

## 2025-05-08 NOTE — PROGRESS NOTES
For full history, physical exam, and prior hospital course, please see previous ED provider note. This is in addition to the primary record.    Patient received in sign out from prior provider team pending CTAP, UA, and reevaluation.    ED Course as of 05/07/25 2153   Wed May 07, 2025   2024 Bladder scan 300 ml. Will give time to see if she can urinate, if she continues to retain will place vazquez. [CG]   2100 CTAP Moderate colonic stool burden and fluid contents in the cecum which may be infectious or inflammatory. No evidence of abnormal wall thickening, hypoenhancement or inflammatory changes. Other chronic findings as above. [CG]   2132 UA negative. She urinated on her own. Repeat bladder scan 170 ml.  [CG]   2152 Patient was reevaluated.  She is feeling better.  No abdominal tenderness.  Patient was discharged home with anticipatory guidance and strict return precautions.  She is advised of her incidental lung nodules and this was documented in her discharge paperwork. [CG]      ED Course User Index  [CG] Virginie Mendoza MD         Diagnoses as of 05/07/25 2153   Lung nodules   Gastroenteritis        Virginie Mendoza MD  EM/IM/Peds    This note was dictated by speech recognition. Minor errors in transcription may be present.

## 2025-05-08 NOTE — DISCHARGE INSTRUCTIONS
You were seen today for abdominal pain.  Your CT shows large stool burden and probable gastroenteritis.  You have some incidental lung nodules that you should discuss with your physician.  Your evaluation was not concerning for an emergency at this time. Please see the attached information sheet for information about your condition, how to care for your condition at home, and reasons to return to the emergency department. Take any prescriptions written today as prescribed. You should call your primary care provider within 24 hours to tell them about today's visit, including any new medications or medication changes, as he or she may want to see you in the office for further evaluation. If you do not have a primary care provider, call  [phone] for an appointment. We offer in-person office visits as well as virtual options. Please do not hesitate to call  [phone] or return to the emergency department with any new or unresolved concerns or symptoms. Thank you for choosing [facility] for your care.

## 2025-05-09 LAB
Q ONSET: 208 MS
QRS COUNT: 11 BEATS
QRS DURATION: 96 MS
QT INTERVAL: 430 MS
QTC CALCULATION(BAZETT): 460 MS
QTC FREDERICIA: 450 MS
R AXIS: -9 DEGREES
T AXIS: -18 DEGREES
T OFFSET: 423 MS
VENTRICULAR RATE: 69 BPM